# Patient Record
Sex: MALE | Employment: OTHER | URBAN - METROPOLITAN AREA
[De-identification: names, ages, dates, MRNs, and addresses within clinical notes are randomized per-mention and may not be internally consistent; named-entity substitution may affect disease eponyms.]

---

## 2017-11-06 ENCOUNTER — HOSPITAL ENCOUNTER (OUTPATIENT)
Dept: GENERAL RADIOLOGY | Age: 67
Discharge: HOME | End: 2017-11-06
Payer: MEDICARE

## 2017-11-06 DIAGNOSIS — M54.31 RIGHT SIDED SCIATICA: ICD-10-CM

## 2017-11-06 PROCEDURE — 72100 X-RAY EXAM L-S SPINE 2/3 VWS: CPT

## 2025-02-26 ENCOUNTER — APPOINTMENT (OUTPATIENT)
Facility: HOSPITAL | Age: 75
End: 2025-02-26
Payer: MEDICARE

## 2025-02-26 ENCOUNTER — HOSPITAL ENCOUNTER (INPATIENT)
Facility: HOSPITAL | Age: 75
LOS: 10 days | Discharge: HOME | End: 2025-03-08
Attending: EMERGENCY MEDICINE | Admitting: HOSPITALIST
Payer: MEDICARE

## 2025-02-26 ENCOUNTER — APPOINTMENT (OUTPATIENT)
Facility: HOSPITAL | Age: 75
End: 2025-02-26
Attending: HOSPITALIST
Payer: MEDICARE

## 2025-02-26 DIAGNOSIS — I46.9 CARDIAC ARREST (HCC): Primary | ICD-10-CM

## 2025-02-26 DIAGNOSIS — I42.9 CARDIOMYOPATHY, UNSPECIFIED TYPE (HCC): ICD-10-CM

## 2025-02-26 DIAGNOSIS — I49.9 IRREGULAR HEART BEAT: ICD-10-CM

## 2025-02-26 DIAGNOSIS — R00.1 BRADYCARDIA: ICD-10-CM

## 2025-02-26 DIAGNOSIS — Z95.2 HISTORY OF TRANSCATHETER AORTIC VALVE REPLACEMENT (TAVR): ICD-10-CM

## 2025-02-26 PROBLEM — I44.7 LEFT BUNDLE BRANCH BLOCK: Status: ACTIVE | Noted: 2025-02-26

## 2025-02-26 PROBLEM — J96.01 ACUTE RESPIRATORY FAILURE WITH HYPOXIA: Status: ACTIVE | Noted: 2025-02-26

## 2025-02-26 PROBLEM — I44.39 HIGH-GRADE ATRIOVENTRICULAR BLOCK: Status: ACTIVE | Noted: 2025-02-26

## 2025-02-26 LAB
ALBUMIN SERPL-MCNC: 2.4 G/DL (ref 3.5–5)
ALBUMIN SERPL-MCNC: 2.8 G/DL (ref 3.5–5)
ALBUMIN/GLOB SERPL: 1 (ref 1.1–2.2)
ALBUMIN/GLOB SERPL: 1 (ref 1.1–2.2)
ALP SERPL-CCNC: 102 U/L (ref 45–117)
ALP SERPL-CCNC: 106 U/L (ref 45–117)
ALT SERPL-CCNC: 232 U/L (ref 12–78)
ALT SERPL-CCNC: 381 U/L (ref 12–78)
AMMONIA PLAS-SCNC: 41 UMOL/L
ANION GAP BLD CALC-SCNC: 16 (ref 10–20)
ANION GAP SERPL CALC-SCNC: 12 MMOL/L (ref 2–12)
ANION GAP SERPL CALC-SCNC: 7 MMOL/L (ref 2–12)
APPEARANCE UR: ABNORMAL
ARTERIAL PATENCY WRIST A: POSITIVE
AST SERPL-CCNC: 374 U/L (ref 15–37)
AST SERPL-CCNC: 705 U/L (ref 15–37)
BACTERIA URNS QL MICRO: ABNORMAL /HPF
BASE DEFICIT BLD-SCNC: 10.2 MMOL/L
BASE DEFICIT BLD-SCNC: 12.4 MMOL/L
BASOPHILS # BLD: 0 K/UL (ref 0–0.1)
BASOPHILS NFR BLD: 0 % (ref 0–1)
BDY SITE: ABNORMAL
BILIRUB DIRECT SERPL-MCNC: 0.1 MG/DL (ref 0–0.2)
BILIRUB SERPL-MCNC: 1.1 MG/DL (ref 0.2–1)
BILIRUB SERPL-MCNC: 1.1 MG/DL (ref 0.2–1)
BILIRUB UR QL CFM: NEGATIVE
BUN SERPL-MCNC: 19 MG/DL (ref 6–20)
BUN SERPL-MCNC: 25 MG/DL (ref 6–20)
BUN/CREAT SERPL: 15 (ref 12–20)
BUN/CREAT SERPL: 16 (ref 12–20)
CA-I BLD-MCNC: 1.09 MMOL/L (ref 1.15–1.33)
CALCIUM SERPL-MCNC: 6.8 MG/DL (ref 8.5–10.1)
CALCIUM SERPL-MCNC: 8.4 MG/DL (ref 8.5–10.1)
CHLORIDE BLD-SCNC: 109 MMOL/L (ref 100–111)
CHLORIDE SERPL-SCNC: 109 MMOL/L (ref 97–108)
CHLORIDE SERPL-SCNC: 116 MMOL/L (ref 97–108)
CO2 BLD-SCNC: 17 MMOL/L (ref 22–29)
CO2 SERPL-SCNC: 18 MMOL/L (ref 21–32)
CO2 SERPL-SCNC: 18 MMOL/L (ref 21–32)
COLOR UR: ABNORMAL
COMMENT:: NORMAL
CREAT SERPL-MCNC: 1.23 MG/DL (ref 0.7–1.3)
CREAT SERPL-MCNC: 1.61 MG/DL (ref 0.7–1.3)
CREAT UR-MCNC: 1 MG/DL (ref 0.6–1.3)
DIFFERENTIAL METHOD BLD: ABNORMAL
ECHO AO ASC DIAM: 3.4 CM
ECHO AO ASCENDING AORTA INDEX: 1.65 CM/M2
ECHO AV AREA PEAK VELOCITY: 1.6 CM2
ECHO AV AREA VTI: 1.7 CM2
ECHO AV AREA/BSA PEAK VELOCITY: 0.8 CM2/M2
ECHO AV AREA/BSA VTI: 0.8 CM2/M2
ECHO AV MEAN GRADIENT: 6 MMHG
ECHO AV MEAN VELOCITY: 1.2 M/S
ECHO AV PEAK GRADIENT: 12 MMHG
ECHO AV PEAK VELOCITY: 1.7 M/S
ECHO AV VELOCITY RATIO: 0.53
ECHO AV VTI: 21.1 CM
ECHO BSA: 2.06 M2
ECHO BSA: 2.06 M2
ECHO LA DIAMETER INDEX: 1.41 CM/M2
ECHO LA DIAMETER: 2.9 CM
ECHO LA VOL A-L A2C: 85 ML (ref 18–58)
ECHO LA VOL A-L A4C: 82 ML (ref 18–58)
ECHO LA VOL BP: 81 ML (ref 18–58)
ECHO LA VOL MOD A2C: 84 ML (ref 18–58)
ECHO LA VOL MOD A4C: 73 ML (ref 18–58)
ECHO LA VOL/BSA BIPLANE: 39 ML/M2 (ref 16–34)
ECHO LA VOLUME AREA LENGTH: 86 ML
ECHO LA VOLUME INDEX A-L A2C: 41 ML/M2 (ref 16–34)
ECHO LA VOLUME INDEX A-L A4C: 40 ML/M2 (ref 16–34)
ECHO LA VOLUME INDEX AREA LENGTH: 42 ML/M2 (ref 16–34)
ECHO LA VOLUME INDEX MOD A2C: 41 ML/M2 (ref 16–34)
ECHO LA VOLUME INDEX MOD A4C: 35 ML/M2 (ref 16–34)
ECHO LV E' LATERAL VELOCITY: 1.87 CM/S
ECHO LV E' SEPTAL VELOCITY: 1.71 CM/S
ECHO LV EDV A2C: 93 ML
ECHO LV EDV A4C: 103 ML
ECHO LV EDV BP: 100 ML (ref 67–155)
ECHO LV EDV INDEX A4C: 50 ML/M2
ECHO LV EDV INDEX BP: 49 ML/M2
ECHO LV EDV NDEX A2C: 45 ML/M2
ECHO LV EF PHYSICIAN: 18 %
ECHO LV EJECTION FRACTION A2C: 23 %
ECHO LV EJECTION FRACTION A4C: 16 %
ECHO LV EJECTION FRACTION BIPLANE: 18 % (ref 55–100)
ECHO LV ESV A2C: 72 ML
ECHO LV ESV A4C: 86 ML
ECHO LV ESV BP: 82 ML (ref 22–58)
ECHO LV ESV INDEX A2C: 35 ML/M2
ECHO LV ESV INDEX A4C: 42 ML/M2
ECHO LV ESV INDEX BP: 40 ML/M2
ECHO LV FRACTIONAL SHORTENING: 15 % (ref 28–44)
ECHO LV INTERNAL DIMENSION DIASTOLE INDEX: 2.33 CM/M2
ECHO LV INTERNAL DIMENSION DIASTOLIC MMODE: 3.5 CM (ref 4.2–5.9)
ECHO LV INTERNAL DIMENSION DIASTOLIC: 4.8 CM (ref 4.2–5.9)
ECHO LV INTERNAL DIMENSION SYSTOLIC INDEX: 1.99 CM/M2
ECHO LV INTERNAL DIMENSION SYSTOLIC MMODE: 2.9 CM
ECHO LV INTERNAL DIMENSION SYSTOLIC: 4.1 CM
ECHO LV IVSD MMODE: 0.1 CM (ref 0.6–1)
ECHO LV IVSD: 1.9 CM (ref 0.6–1)
ECHO LV IVSS MMODE: 0.1 CM
ECHO LV MASS 2D: 437.8 G (ref 88–224)
ECHO LV MASS INDEX 2D: 212.5 G/M2 (ref 49–115)
ECHO LV POSTERIOR WALL DIASTOLIC MMODE: 0.1 CM (ref 0.6–1)
ECHO LV POSTERIOR WALL DIASTOLIC: 1.9 CM (ref 0.6–1)
ECHO LV POSTERIOR WALL SYSTOLIC MMODE: 0.1 CM
ECHO LV RELATIVE WALL THICKNESS RATIO: 0.79
ECHO LVOT AREA: 3.1 CM2
ECHO LVOT AV VTI INDEX: 0.53
ECHO LVOT DIAM: 2 CM
ECHO LVOT MEAN GRADIENT: 1 MMHG
ECHO LVOT PEAK GRADIENT: 3 MMHG
ECHO LVOT PEAK VELOCITY: 0.9 M/S
ECHO LVOT STROKE VOLUME INDEX: 17.1 ML/M2
ECHO LVOT SV: 35.2 ML
ECHO LVOT VTI: 11.2 CM
ECHO MV A VELOCITY: 0.29 M/S
ECHO MV E DECELERATION TIME (DT): 168.1 MS
ECHO MV E VELOCITY: 1.1 M/S
ECHO MV E/A RATIO: 3.79
ECHO MV E/E' LATERAL: 58.82
ECHO MV E/E' RATIO (AVERAGED): 61.58
ECHO MV E/E' SEPTAL: 64.33
ECHO MV REGURGITANT PEAK GRADIENT: 36 MMHG
ECHO MV REGURGITANT PEAK VELOCITY: 3 M/S
ECHO PULMONARY ARTERY END DIASTOLIC PRESSURE: 4 MMHG
ECHO PV MAX VELOCITY: 0.7 M/S
ECHO PV PEAK GRADIENT: 2 MMHG
ECHO PV REGURGITANT MAX VELOCITY: 1 M/S
ECHO RV FREE WALL PEAK S': 10.5 CM/S
ECHO RV INTERNAL DIMENSION: 4.4 CM
ECHO RV TAPSE: 1.1 CM (ref 1.7–?)
ECHO RVOT MEAN GRADIENT: 1 MMHG
ECHO RVOT PEAK GRADIENT: 1 MMHG
ECHO RVOT PEAK VELOCITY: 0.5 M/S
ECHO RVOT VTI: 6 CM
ECHO TV REGURGITANT MAX VELOCITY: 2.4 M/S
ECHO TV REGURGITANT PEAK GRADIENT: 25 MMHG
EOSINOPHIL # BLD: 0.17 K/UL (ref 0–0.4)
EOSINOPHIL NFR BLD: 1 % (ref 0–7)
EPITH CASTS URNS QL MICRO: ABNORMAL /LPF
ERYTHROCYTE [DISTWIDTH] IN BLOOD BY AUTOMATED COUNT: 11.9 % (ref 11.5–14.5)
ETHANOL SERPL-MCNC: <10 MG/DL (ref 0–0.08)
FLUAV RNA SPEC QL NAA+PROBE: NOT DETECTED
FLUBV RNA SPEC QL NAA+PROBE: NOT DETECTED
GAS FLOW.O2 O2 DELIVERY SYS: ABNORMAL
GAS FLOW.O2 SETTING OXYMISER: 20 BPM
GLOBULIN SER CALC-MCNC: 2.3 G/DL (ref 2–4)
GLOBULIN SER CALC-MCNC: 2.8 G/DL (ref 2–4)
GLUCOSE BLD STRIP.AUTO-MCNC: 280 MG/DL (ref 65–117)
GLUCOSE BLD STRIP.AUTO-MCNC: 297 MG/DL (ref 74–99)
GLUCOSE SERPL-MCNC: 130 MG/DL (ref 65–100)
GLUCOSE SERPL-MCNC: 235 MG/DL (ref 65–100)
GLUCOSE UR STRIP.AUTO-MCNC: 250 MG/DL
HCO3 BLD-SCNC: 17.3 MMOL/L (ref 21–28)
HCO3 BLDA-SCNC: 17 MMOL/L
HCT VFR BLD AUTO: 42 % (ref 36.6–50.3)
HGB BLD-MCNC: 13.2 G/DL (ref 12.1–17)
HGB UR QL STRIP: ABNORMAL
IMM GRANULOCYTES # BLD AUTO: 0 K/UL
IMM GRANULOCYTES NFR BLD AUTO: 0 %
INR PPP: 1.3 (ref 0.9–1.1)
KETONES UR QL STRIP.AUTO: NEGATIVE MG/DL
LACTATE BLD-SCNC: 5.88 MMOL/L (ref 0.4–2)
LACTATE SERPL-SCNC: 4.1 MMOL/L (ref 0.4–2)
LACTATE SERPL-SCNC: 4.3 MMOL/L (ref 0.4–2)
LEUKOCYTE ESTERASE UR QL STRIP.AUTO: ABNORMAL
LIPASE SERPL-CCNC: 57 U/L (ref 13–75)
LYMPHOCYTES # BLD: 2.89 K/UL (ref 0.8–3.5)
LYMPHOCYTES NFR BLD: 17 % (ref 12–49)
MAGNESIUM SERPL-MCNC: 3.4 MG/DL (ref 1.6–2.4)
MCH RBC QN AUTO: 32.4 PG (ref 26–34)
MCHC RBC AUTO-ENTMCNC: 31.4 G/DL (ref 30–36.5)
MCV RBC AUTO: 102.9 FL (ref 80–99)
METAMYELOCYTES NFR BLD MANUAL: 1 %
MONOCYTES # BLD: 0.68 K/UL (ref 0–1)
MONOCYTES NFR BLD: 4 % (ref 5–13)
MYELOCYTES NFR BLD MANUAL: 2 %
NEUTS BAND NFR BLD MANUAL: 3 % (ref 0–6)
NEUTS SEG # BLD: 12.75 K/UL (ref 1.8–8)
NEUTS SEG NFR BLD: 72 % (ref 32–75)
NITRITE UR QL STRIP.AUTO: NEGATIVE
NRBC # BLD: 0 K/UL (ref 0–0.01)
NRBC BLD-RTO: 0 PER 100 WBC
O2/TOTAL GAS SETTING VFR VENT: 100 %
PCO2 BLD: 43.1 MMHG (ref 35–48)
PCO2 BLDV: 51.1 MMHG (ref 41–51)
PEEP RESPIRATORY: 6 CMH2O
PH BLD: 7.21 (ref 7.35–7.45)
PH BLDV: 7.13 (ref 7.32–7.42)
PH UR STRIP: 5.5 (ref 5–8)
PLATELET # BLD AUTO: 191 K/UL (ref 150–400)
PMV BLD AUTO: 10.4 FL (ref 8.9–12.9)
PO2 BLD: 463 MMHG (ref 83–108)
PO2 BLDV: <27 MMHG (ref 25–40)
POTASSIUM BLD-SCNC: 3.4 MMOL/L (ref 3.5–5.5)
POTASSIUM SERPL-SCNC: 3.9 MMOL/L (ref 3.5–5.1)
POTASSIUM SERPL-SCNC: 4.8 MMOL/L (ref 3.5–5.1)
PROT SERPL-MCNC: 4.7 G/DL (ref 6.4–8.2)
PROT SERPL-MCNC: 5.6 G/DL (ref 6.4–8.2)
PROT UR STRIP-MCNC: >300 MG/DL
PROTHROMBIN TIME: 13.7 SEC (ref 9.2–11.2)
RBC # BLD AUTO: 4.08 M/UL (ref 4.1–5.7)
RBC #/AREA URNS HPF: ABNORMAL /HPF (ref 0–5)
RBC MORPH BLD: ABNORMAL
SAO2 % BLD: 100 % (ref 92–97)
SARS-COV-2 RNA RESP QL NAA+PROBE: NOT DETECTED
SERVICE CMNT-IMP: ABNORMAL
SERVICE CMNT-IMP: ABNORMAL
SODIUM BLD-SCNC: 142 MMOL/L (ref 136–145)
SODIUM SERPL-SCNC: 139 MMOL/L (ref 136–145)
SODIUM SERPL-SCNC: 141 MMOL/L (ref 136–145)
SOURCE: NORMAL
SP GR UR REFRACTOMETRY: 1.02 (ref 1–1.03)
SPECIMEN HOLD: NORMAL
SPECIMEN SITE: ABNORMAL
SPECIMEN TYPE: ABNORMAL
SPERM URNS QL MICRO: PRESENT
TROPONIN I SERPL HS-MCNC: 879 NG/L (ref 0–76)
URINE CULTURE IF INDICATED: ABNORMAL
UROBILINOGEN UR QL STRIP.AUTO: 1 EU/DL (ref 0.2–1)
VENTILATION MODE VENT: ABNORMAL
VT SETTING VENT: 400 ML
WBC # BLD AUTO: 17 K/UL (ref 4.1–11.1)
WBC URNS QL MICRO: ABNORMAL /HPF (ref 0–4)

## 2025-02-26 PROCEDURE — 84484 ASSAY OF TROPONIN QUANT: CPT

## 2025-02-26 PROCEDURE — 99291 CRITICAL CARE FIRST HOUR: CPT | Performed by: INTERNAL MEDICINE

## 2025-02-26 PROCEDURE — 31500 INSERT EMERGENCY AIRWAY: CPT

## 2025-02-26 PROCEDURE — 02HK3JZ INSERTION OF PACEMAKER LEAD INTO RIGHT VENTRICLE, PERCUTANEOUS APPROACH: ICD-10-PCS | Performed by: HOSPITALIST

## 2025-02-26 PROCEDURE — 5A1223Z PERFORMANCE OF CARDIAC PACING, CONTINUOUS: ICD-10-PCS | Performed by: HOSPITALIST

## 2025-02-26 PROCEDURE — 93005 ELECTROCARDIOGRAM TRACING: CPT | Performed by: EMERGENCY MEDICINE

## 2025-02-26 PROCEDURE — C1894 INTRO/SHEATH, NON-LASER: HCPCS | Performed by: HOSPITALIST

## 2025-02-26 PROCEDURE — 2500000003 HC RX 250 WO HCPCS: Performed by: HOSPITALIST

## 2025-02-26 PROCEDURE — 2000000000 HC ICU R&B

## 2025-02-26 PROCEDURE — 82330 ASSAY OF CALCIUM: CPT

## 2025-02-26 PROCEDURE — 3E033XZ INTRODUCTION OF VASOPRESSOR INTO PERIPHERAL VEIN, PERCUTANEOUS APPROACH: ICD-10-PCS | Performed by: EMERGENCY MEDICINE

## 2025-02-26 PROCEDURE — 87636 SARSCOV2 & INF A&B AMP PRB: CPT

## 2025-02-26 PROCEDURE — 76937 US GUIDE VASCULAR ACCESS: CPT | Performed by: HOSPITALIST

## 2025-02-26 PROCEDURE — 71045 X-RAY EXAM CHEST 1 VIEW: CPT

## 2025-02-26 PROCEDURE — 93005 ELECTROCARDIOGRAM TRACING: CPT | Performed by: HOSPITALIST

## 2025-02-26 PROCEDURE — 93306 TTE W/DOPPLER COMPLETE: CPT | Performed by: STUDENT IN AN ORGANIZED HEALTH CARE EDUCATION/TRAINING PROGRAM

## 2025-02-26 PROCEDURE — 36415 COLL VENOUS BLD VENIPUNCTURE: CPT

## 2025-02-26 PROCEDURE — 82962 GLUCOSE BLOOD TEST: CPT

## 2025-02-26 PROCEDURE — 84295 ASSAY OF SERUM SODIUM: CPT

## 2025-02-26 PROCEDURE — 2709999900 HC NON-CHARGEABLE SUPPLY: Performed by: HOSPITALIST

## 2025-02-26 PROCEDURE — 87086 URINE CULTURE/COLONY COUNT: CPT

## 2025-02-26 PROCEDURE — 83735 ASSAY OF MAGNESIUM: CPT

## 2025-02-26 PROCEDURE — 6360000002 HC RX W HCPCS: Performed by: HOSPITALIST

## 2025-02-26 PROCEDURE — 82140 ASSAY OF AMMONIA: CPT

## 2025-02-26 PROCEDURE — 2500000003 HC RX 250 WO HCPCS: Performed by: EMERGENCY MEDICINE

## 2025-02-26 PROCEDURE — 94761 N-INVAS EAR/PLS OXIMETRY MLT: CPT

## 2025-02-26 PROCEDURE — 81001 URINALYSIS AUTO W/SCOPE: CPT

## 2025-02-26 PROCEDURE — 6360000002 HC RX W HCPCS: Performed by: EMERGENCY MEDICINE

## 2025-02-26 PROCEDURE — 82077 ASSAY SPEC XCP UR&BREATH IA: CPT

## 2025-02-26 PROCEDURE — 70450 CT HEAD/BRAIN W/O DYE: CPT

## 2025-02-26 PROCEDURE — 99223 1ST HOSP IP/OBS HIGH 75: CPT | Performed by: INTERNAL MEDICINE

## 2025-02-26 PROCEDURE — 2580000003 HC RX 258: Performed by: EMERGENCY MEDICINE

## 2025-02-26 PROCEDURE — C1892 INTRO/SHEATH,FIXED,PEEL-AWAY: HCPCS | Performed by: HOSPITALIST

## 2025-02-26 PROCEDURE — 6360000002 HC RX W HCPCS: Performed by: PHYSICIAN ASSISTANT

## 2025-02-26 PROCEDURE — 80076 HEPATIC FUNCTION PANEL: CPT

## 2025-02-26 PROCEDURE — 51702 INSERT TEMP BLADDER CATH: CPT

## 2025-02-26 PROCEDURE — 99291 CRITICAL CARE FIRST HOUR: CPT

## 2025-02-26 PROCEDURE — 85610 PROTHROMBIN TIME: CPT

## 2025-02-26 PROCEDURE — 99292 CRITICAL CARE ADDL 30 MIN: CPT

## 2025-02-26 PROCEDURE — 93306 TTE W/DOPPLER COMPLETE: CPT

## 2025-02-26 PROCEDURE — 80053 COMPREHEN METABOLIC PANEL: CPT

## 2025-02-26 PROCEDURE — 82803 BLOOD GASES ANY COMBINATION: CPT

## 2025-02-26 PROCEDURE — 36600 WITHDRAWAL OF ARTERIAL BLOOD: CPT

## 2025-02-26 PROCEDURE — 6370000000 HC RX 637 (ALT 250 FOR IP): Performed by: EMERGENCY MEDICINE

## 2025-02-26 PROCEDURE — C1898 LEAD, PMKR, OTHER THAN TRANS: HCPCS | Performed by: HOSPITALIST

## 2025-02-26 PROCEDURE — 84132 ASSAY OF SERUM POTASSIUM: CPT

## 2025-02-26 PROCEDURE — 5A1945Z RESPIRATORY VENTILATION, 24-96 CONSECUTIVE HOURS: ICD-10-PCS | Performed by: EMERGENCY MEDICINE

## 2025-02-26 PROCEDURE — 5A12012 PERFORMANCE OF CARDIAC OUTPUT, SINGLE, MANUAL: ICD-10-PCS | Performed by: EMERGENCY MEDICINE

## 2025-02-26 PROCEDURE — 83690 ASSAY OF LIPASE: CPT

## 2025-02-26 PROCEDURE — 33210 INSERT ELECTRD/PM CATH SNGL: CPT | Performed by: HOSPITALIST

## 2025-02-26 PROCEDURE — 94002 VENT MGMT INPAT INIT DAY: CPT

## 2025-02-26 PROCEDURE — 85025 COMPLETE CBC W/AUTO DIFF WBC: CPT

## 2025-02-26 PROCEDURE — 83605 ASSAY OF LACTIC ACID: CPT

## 2025-02-26 PROCEDURE — 2580000003 HC RX 258: Performed by: HOSPITALIST

## 2025-02-26 PROCEDURE — 82947 ASSAY GLUCOSE BLOOD QUANT: CPT

## 2025-02-26 PROCEDURE — 92950 HEART/LUNG RESUSCITATION CPR: CPT

## 2025-02-26 DEVICE — LEAD 5076-58 MRI US RCMCRD
Type: IMPLANTABLE DEVICE | Status: FUNCTIONAL
Brand: CAPSUREFIX NOVUS MRI™ SURESCAN®

## 2025-02-26 RX ORDER — POLYETHYLENE GLYCOL 3350 17 G/17G
17 POWDER, FOR SOLUTION ORAL DAILY PRN
Status: DISCONTINUED | OUTPATIENT
Start: 2025-02-26 | End: 2025-03-08 | Stop reason: HOSPADM

## 2025-02-26 RX ORDER — FENTANYL CITRATE-0.9 % NACL/PF 10 MCG/ML
25-200 PLASTIC BAG, INJECTION (ML) INTRAVENOUS CONTINUOUS
Status: DISCONTINUED | OUTPATIENT
Start: 2025-02-26 | End: 2025-02-27

## 2025-02-26 RX ORDER — SODIUM CHLORIDE 9 MG/ML
INJECTION, SOLUTION INTRAVENOUS PRN
Status: DISCONTINUED | OUTPATIENT
Start: 2025-02-26 | End: 2025-03-08 | Stop reason: HOSPADM

## 2025-02-26 RX ORDER — 0.9 % SODIUM CHLORIDE 0.9 %
1000 INTRAVENOUS SOLUTION INTRAVENOUS ONCE
Status: COMPLETED | OUTPATIENT
Start: 2025-02-26 | End: 2025-02-26

## 2025-02-26 RX ORDER — SODIUM CHLORIDE 0.9 % (FLUSH) 0.9 %
5-40 SYRINGE (ML) INJECTION EVERY 12 HOURS SCHEDULED
Status: DISCONTINUED | OUTPATIENT
Start: 2025-02-26 | End: 2025-03-08 | Stop reason: HOSPADM

## 2025-02-26 RX ORDER — CLOPIDOGREL BISULFATE 75 MG/1
75 TABLET ORAL DAILY
COMMUNITY

## 2025-02-26 RX ORDER — ACETAMINOPHEN 325 MG/1
650 TABLET ORAL EVERY 6 HOURS PRN
Status: DISCONTINUED | OUTPATIENT
Start: 2025-02-26 | End: 2025-03-03

## 2025-02-26 RX ORDER — DOBUTAMINE HYDROCHLORIDE 200 MG/100ML
2.5-1 INJECTION INTRAVENOUS CONTINUOUS
Status: DISCONTINUED | OUTPATIENT
Start: 2025-02-26 | End: 2025-02-28

## 2025-02-26 RX ORDER — PROPOFOL 10 MG/ML
5-50 INJECTION, EMULSION INTRAVENOUS CONTINUOUS
Status: DISCONTINUED | OUTPATIENT
Start: 2025-02-26 | End: 2025-02-27

## 2025-02-26 RX ORDER — ONDANSETRON 4 MG/1
4 TABLET, ORALLY DISINTEGRATING ORAL EVERY 8 HOURS PRN
Status: DISCONTINUED | OUTPATIENT
Start: 2025-02-26 | End: 2025-03-08 | Stop reason: HOSPADM

## 2025-02-26 RX ORDER — ONDANSETRON 2 MG/ML
4 INJECTION INTRAMUSCULAR; INTRAVENOUS EVERY 6 HOURS PRN
Status: DISCONTINUED | OUTPATIENT
Start: 2025-02-26 | End: 2025-03-08 | Stop reason: HOSPADM

## 2025-02-26 RX ORDER — LIDOCAINE HYDROCHLORIDE 20 MG/ML
JELLY TOPICAL DAILY PRN
Status: COMPLETED | OUTPATIENT
Start: 2025-02-26 | End: 2025-02-26

## 2025-02-26 RX ORDER — EPINEPHRINE IN SOD CHLOR,ISO 1 MG/10 ML
SYRINGE (ML) INTRAVENOUS DAILY PRN
Status: COMPLETED | OUTPATIENT
Start: 2025-02-26 | End: 2025-02-26

## 2025-02-26 RX ORDER — ENOXAPARIN SODIUM 100 MG/ML
40 INJECTION SUBCUTANEOUS EVERY 24 HOURS
Status: DISCONTINUED | OUTPATIENT
Start: 2025-02-26 | End: 2025-02-27

## 2025-02-26 RX ORDER — SODIUM CHLORIDE 0.9 % (FLUSH) 0.9 %
5-40 SYRINGE (ML) INJECTION PRN
Status: DISCONTINUED | OUTPATIENT
Start: 2025-02-26 | End: 2025-03-08 | Stop reason: HOSPADM

## 2025-02-26 RX ORDER — MAGNESIUM SULFATE IN WATER 40 MG/ML
2000 INJECTION, SOLUTION INTRAVENOUS PRN
Status: DISCONTINUED | OUTPATIENT
Start: 2025-02-26 | End: 2025-03-08 | Stop reason: HOSPADM

## 2025-02-26 RX ORDER — ASPIRIN 81 MG/1
81 TABLET, CHEWABLE ORAL DAILY
Status: ON HOLD | COMMUNITY
End: 2025-03-08 | Stop reason: HOSPADM

## 2025-02-26 RX ORDER — ACETAMINOPHEN 650 MG/1
650 SUPPOSITORY RECTAL EVERY 6 HOURS PRN
Status: DISCONTINUED | OUTPATIENT
Start: 2025-02-26 | End: 2025-03-08 | Stop reason: HOSPADM

## 2025-02-26 RX ORDER — 0.9 % SODIUM CHLORIDE 0.9 %
1000 INTRAVENOUS SOLUTION INTRAVENOUS ONCE
Status: DISCONTINUED | OUTPATIENT
Start: 2025-02-26 | End: 2025-02-26

## 2025-02-26 RX ORDER — SODIUM CHLORIDE 9 MG/ML
INJECTION, SOLUTION INTRAVENOUS CONTINUOUS
Status: DISCONTINUED | OUTPATIENT
Start: 2025-02-26 | End: 2025-02-27

## 2025-02-26 RX ORDER — FENTANYL CITRATE-0.9 % NACL/PF 20 MCG/2ML
25 SYRINGE (ML) INTRAVENOUS EVERY 30 MIN PRN
Status: DISCONTINUED | OUTPATIENT
Start: 2025-02-26 | End: 2025-02-27

## 2025-02-26 RX ORDER — POTASSIUM CHLORIDE 29.8 MG/ML
20 INJECTION INTRAVENOUS PRN
Status: DISCONTINUED | OUTPATIENT
Start: 2025-02-26 | End: 2025-03-08 | Stop reason: HOSPADM

## 2025-02-26 RX ORDER — POTASSIUM CHLORIDE 7.45 MG/ML
10 INJECTION INTRAVENOUS PRN
Status: DISCONTINUED | OUTPATIENT
Start: 2025-02-26 | End: 2025-03-08 | Stop reason: HOSPADM

## 2025-02-26 RX ORDER — NOREPINEPHRINE BITARTRATE 0.06 MG/ML
1-100 INJECTION, SOLUTION INTRAVENOUS CONTINUOUS
Status: DISCONTINUED | OUTPATIENT
Start: 2025-02-26 | End: 2025-02-27

## 2025-02-26 RX ADMIN — AMIODARONE HYDROCHLORIDE 0.5 MG/MIN: 50 INJECTION, SOLUTION INTRAVENOUS at 20:00

## 2025-02-26 RX ADMIN — EPINEPHRINE 2 MCG/MIN: 1 INJECTION INTRAMUSCULAR; INTRAVENOUS; SUBCUTANEOUS at 08:50

## 2025-02-26 RX ADMIN — Medication 25 MCG/HR: at 21:09

## 2025-02-26 RX ADMIN — EPINEPHRINE 1 MG: 0.1 INJECTION, SOLUTION ENDOTRACHEAL; INTRACARDIAC; INTRAVENOUS at 08:36

## 2025-02-26 RX ADMIN — SODIUM CHLORIDE 1000 ML: 9 INJECTION, SOLUTION INTRAVENOUS at 10:54

## 2025-02-26 RX ADMIN — AMIODARONE HYDROCHLORIDE 0.5 MG/MIN: 50 INJECTION, SOLUTION INTRAVENOUS at 11:53

## 2025-02-26 RX ADMIN — PROPOFOL 25 MCG/KG/MIN: 10 INJECTION, EMULSION INTRAVENOUS at 22:43

## 2025-02-26 RX ADMIN — SODIUM CHLORIDE, PRESERVATIVE FREE 10 ML: 5 INJECTION INTRAVENOUS at 14:27

## 2025-02-26 RX ADMIN — AMIODARONE HYDROCHLORIDE 1 MG/MIN: 50 INJECTION, SOLUTION INTRAVENOUS at 09:23

## 2025-02-26 RX ADMIN — NOREPINEPHRINE BITARTRATE 5 MCG/MIN: 64 SOLUTION INTRAVENOUS at 09:58

## 2025-02-26 RX ADMIN — SODIUM CHLORIDE 1000 ML: 9 INJECTION, SOLUTION INTRAVENOUS at 14:25

## 2025-02-26 RX ADMIN — LIDOCAINE HYDROCHLORIDE 100 MG: 20 JELLY TOPICAL at 08:31

## 2025-02-26 RX ADMIN — PROPOFOL 20 MCG/KG/MIN: 10 INJECTION, EMULSION INTRAVENOUS at 08:59

## 2025-02-26 RX ADMIN — SODIUM CHLORIDE, PRESERVATIVE FREE 10 ML: 5 INJECTION INTRAVENOUS at 21:10

## 2025-02-26 RX ADMIN — PROPOFOL 25 MCG/KG/MIN: 10 INJECTION, EMULSION INTRAVENOUS at 15:20

## 2025-02-26 ASSESSMENT — PULMONARY FUNCTION TESTS
PIF_VALUE: 17
PIF_VALUE: 17
PIF_VALUE: 16
PIF_VALUE: 16

## 2025-02-26 NOTE — PROCEDURES
ELECTROPHYSIOLOGY PROCEDURE     PROCEDURE DATE: 2/26/2025    OPERATION PERFORMED:    -Right internal jugular venous access (ultrasound-guided)   -Single-chamber Temporary-Permament Ventricular Pacing Lead Implantation (screw-in Medtronic [id number] lead)     LAB PHYSICIAN: Stuart Sena MD     COMPLICATIONS: None.      TIME OUT: Time out was completed with verification of the correct patient identity, procedure to be performed, procedure site and implanted equipment.      INDICATION:  High-grade AV block  Reported torsade the points  Cardiac arrest with prolonged down time     PROCEDURE AND FINDINGS:  Informed consent was given prior to the procedure and confirmed.   Patient was intubated and sedated.  Informed consent was obtained from patient's MPOA.  Risk and benefits explained in detail and MPOA agrees to proceed with procedure.     Intravenous prophylactic antibiotics were administered prior to the procedure. After the site of implantation was prepped and draped in the usual sterile fashion and after adequate anesthesia was given, the skin was infiltrated with local anesthetic.       ULTRA-SOUND-GUIDED ACCESS   Ultrasound was utilized to confirm right IJ venous patency. Needle access was obtained under ultrasound guidance and a permanent recording obtained.  The right IJ vein was accessed via modified Seldinger technique under ultrasound guidance with a micropuncture needle. A single access was obtained.  J tip 0.035 inch guide wire/wires were introduced and their course throughout the venous system was confirmed by their presence under fluoroscopy in the inferior vena cava.        VENTRICULAR LEAD   A peel away sheath was brought to the field and placed into the venous system via over the wire technique.  The right ventricular lead (Medtronic 5076 lead, 58 cm) was placed via this sheath into the RV.  On the initial to position in anteroseptum, threshold was high.  Therefore reposition it to inferoseptum

## 2025-02-26 NOTE — H&P
SOUND CRITICAL CARE HPI.      Name: Jeb Pereira   : 1950   MRN: [id number]   Date: 2025      Chief Complaint   Patient presents with    Cardiac Arrest     Cardiac arrest, significant downtime with CPR of 80-90 mins total    HPI     History is limited, obtained from wife/ daughter at bedside and EMR review.   Patient had underwent TAVR at [facility] last week with prior normal LHC and was discharged home on DAPT that he has been compliant to. Wife reports no other medication and patient has been doing fine until this AM when she heard her  falling and found him on the ground. She called 911. Shortly before EMS arrived, patient became unresponsive, pulseless and cyanosed. EMS started CPR and continued for almost an hour in field with shockable rhythm and suspicion of torsades. Patient was shocked multiple times and brought in ER where CPR continued and total CPR time around 80-90 mins.   EKG in ER is showing bradycardia with wide complex, junctional rhythm. Patient was started on Epi and Levophed added.   CT head with no bleeding. ICU service was called for ICU admission.   On my initial evaluation in ER, patient was on 20 mcg of levophed and 4 mcg of Epi with amiodarone eat rate of 1 mg per minute.   Patient on no sedation, not responsive but breathing over the vent.   Stat echo and cardiology consult placed. Echo reported as reduced LV function and EF 18%, grade 3 DD, concentric LVH and increased LV mass, highly suspicious for infiltrative CMP.    On arrival in ICU, failed to wean epi and patient became hypotensive with intermittent episodes of bradycardia and Epi drip was started back.   Patient was evaluated by cardiology and in evening taken to cath lab for pacemaker insertion.     Assessment & Plan     # Sudden Cardiac arrest, at home. Significant down time of 80-90 mins when patient achieved ROSC and lost the pulse again. Multiple shocks given by EMS for shock-able rhythm  Provider   clopidogrel (PLAVIX) 75 MG tablet Take 1 tablet by mouth daily   Yes ProviderCesilia MD   aspirin 81 MG chewable tablet Take 1 tablet by mouth daily   Yes Provider, Cesilia, MD         Allergies/Social/Family History:     Not on File   Social History     Tobacco Use    Smoking status: Not on file    Smokeless tobacco: Not on file   Substance Use Topics    Alcohol use: Not on file      No family history on file.    LABS AND  DATA:   Reviewed    Peak airway pressure:      Minute ventilation:

## 2025-02-26 NOTE — CARE COORDINATION
Care Management Initial Assessment  2/26/2025 12:08 PM  If patient is discharged prior to next notation, then this note serves as note for discharge by case management.    Reason for Admission:   Cardiac arrest (HCC) [I46.9]         Patient Admission Status: Inpatient  Date Admitted to [facility]: 2/26  RUR: Readmission Risk Score: 8.7    Hospitalization in the last 30 days (Readmission):  No        Advance Care Planning:  Code Status: Full Code  Primary Healthcare Decision Maker: (P) Legal Next of Kin   Advance Directive: has an advanced directive - a copy HAS NOT been provided.     __________________________________________________________________________  Assessment:      02/26/25 1206   Service Assessment   Patient Orientation Unresponsive;Unable to Assess   Cognition Other (see comment)  (pt came in unresponsive on PATRICK device)   History Provided By Medical Record;Child/Family   Primary Caregiver Self   Accompanied By/Relationship Spouse, child   Support Systems Spouse/Significant Other;Children;Family Members   Patient's Healthcare Decision Maker is: Legal Next of Kin   Prior Functional Level Independent in ADLs/IADLs   Current Functional Level Independent in ADLs/IADLs   Can patient return to prior living arrangement Unknown at present   Ability to make needs known: Unable   Family able to assist with home care needs: Other (comment)  (dependent on pt's stabilization and abilities at dc)   Would you like for me to discuss the discharge plan with any other family members/significant others, and if so, who? No   Financial Resources Medicare;Other (Comment)  ([employer] secondary)   Community Resources None   Social/Functional History   Lives With Spouse   Prior Level of Assist for ADLs Independent   Prior Level of Assist for Homemaking Independent   Ambulation Assistance Independent   Prior Level of Assist for Transfers Independent         Comments:   Pt was a code blue, came in with EMS on PATRICK device unresponsive  recommendations.        Camelia Taylor  Case Management Department  For questions or concerns, please PerfectServe

## 2025-02-26 NOTE — CARE COORDINATION
Care Management Initial Assessment  2/26/2025 3:54 PM  If patient is discharged prior to next notation, then this note serves as note for discharge by case management.    Reason for Admission:   Cardiac arrest (HCC) [I46.9]  Procedure(s) (LRB):  Insert temporary pacemaker (N/A)       Patient Admission Status: Inpatient  Date Admitted to INP: 2/26/25  RUR: Readmission Risk Score: 8.7    Hospitalization in the last 30 days (Readmission):          Advance Care Planning:  Code Status: Full Code  Primary Healthcare Decision Maker: Legal Next of Kin   Advance Directive: unknown - pt can't communicate and pt is intubated      __________________________________________________________________________  Assessment:   Recent TAVR @ [facility]  Wife found pt unresponsive and without a pulse    See ED case manager's initial assessment    Comments: intubated and sedated     Discharge Concerns: []Yes []No []Unknown-unknown    Describe:    Financial concerns/barriers: []Yes, explain: [x]No []Unknown/Not discussed  __________________________________________________________________________    Insurer:   Active Insurance as of 2/26/2025       Primary Coverage       Payor Plan Insurance Group Employer/Plan Group    MEDICARE MEDICARE PART A AND B        Payor Address Payor Phone Number Payor Fax Number Effective Dates    [address] [address] [phone]  7/1/2016 - None Entered    [facility] [address]         Subscriber Name Subscriber Birth Date Member ID       MAXWELL WEEKS 1950 [id number]               Secondary Coverage       Payor Plan Insurance Group Employer/Plan Group    CIGNA CIGNA MEDICARE SUPP        Payor Plan Address Payor Plan Phone Number Payor Plan Fax Number Effective Dates    [address] [address]   9/1/2021 - None [facility] [address]         Subscriber Name Subscriber Birth Date Member ID       MAXWELL WEEKS 1950 [id number]                     PCP: Farrah Solis PA   Address: [address] [place] [address]  / [facility] [address]   Phone number: [phone]    Pharmacy:   [id number] - [place], [place] - [address] - P [phone] - F [phone]  [address]  [facility] [address]  Phone: [phone] Fax: [phone]    DC Transport:  to be determined        Transition of care plan:    [x]Unable to determine at this time. Awaiting clinical progress, and disposition recommendations.    [] Home. No assistance required.     [] Home. Pt refused recommended services.    [] Home with family assistance as needed, and outpatient follow-up.    [] Home with Outpatient PT and outpatient follow-up   Pt aware of OP appt? []Yes, Provider:   []Not scheduled   Transport provider:     [] Home with outpatient services.    Specify:    [] Home with Home Health   - [place] of Choice offered? [] Yes, Preference:   [] NA    []SNF/IPR   -[]Freedom of Choice offered, and preferences given:   []Listing provided and preferences requested   -Status: []Pending []Accepted:    -Auth required: []Yes []No    -Auth initiated date:   -3 midnight stay required: []Yes []No  Date satisfied:     [] LTC:     [] Home with Hospice   - [place] of Choice offered? [] Yes, Preference:   [] NA    [] [facility] information provided.     [x] Other: registration notified about need for first IMM letter      JOHN PABLO RN  Case Management Department  For questions or concerns, please PerfectServe

## 2025-02-26 NOTE — ED PROVIDER NOTES
[facility] EMERGENCY DEPARTMENT  EMERGENCY DEPARTMENT ENCOUNTER      Pt Name: Jeb Pereira  MRN: [id number]  Birthdate 1950  Date of evaluation: 2/26/2025  Provider: Desmond Mtz MD    CHIEF COMPLAINT       Chief Complaint   Patient presents with    Cardiac Arrest         HISTORY OF PRESENT ILLNESS   (Location/Symptom, Timing/Onset, Context/Setting, Quality, Duration, Modifying Factors, Severity)  Note limiting factors.   Jeb Pereira is a 74 y.o. male who presents to the emergency department      The history is provided by the spouse and the EMS personnel. No  was used.       Nursing Notes were reviewed.    REVIEW OF SYSTEMS    (2-9 systems for level 4, 10 or more for level 5)     Review of Systems   Unable to perform ROS: Patient unresponsive       Except as noted above the remainder of the review of systems was reviewed and negative.       PAST MEDICAL HISTORY   No past medical history on file.      SURGICAL HISTORY     No past surgical history on file.      CURRENT MEDICATIONS       Previous Medications    No medications on file       ALLERGIES     Patient has no allergy information on record.    FAMILY HISTORY     No family history on file.       SOCIAL HISTORY       Social History     Socioeconomic History    Marital status:        SCREENINGS                               CIWA Assessment  Pulse: 70                 PHYSICAL EXAM    (up to 7 for level 4, 8 or more for level 5)     ED Triage Vitals   BP Systolic BP Percentile Diastolic BP Percentile Temp Temp src Pulse Resp SpO2   -- -- -- -- -- 02/26/25 0836 -- --        (!) 44        Height Weight - Scale         -- 02/26/25 0845          85 kg (187 lb 8 oz)             Physical Exam  Constitutional:       General: He is in acute distress.      Appearance: He is toxic-appearing.      Interventions: He is intubated.   HENT:      Head: Raccoon eyes and abrasion present.   Cardiovascular:      Comments:  Consult for Admission  9:13 AM    ED Room Number: ER16/16  Patient Name and age:  Jeb Pereira 74 y.o.  male  Working Diagnosis:   1. Cardiac arrest (HCC)        COVID-19 Suspicion: No  Sepsis present:  No  Reassessment needed: Yes  Code Status:  Full Code  Readmission: No  Isolation Requirements: no  Recommended Level of Care: ICU  Department: [place] ED - [phone]  Consulting Provider: d/w Dr. Harrington    CRITICAL CARE TIME   Total Critical Care time was 120 minutes, excluding separately reportable procedures.  There was a high probability of clinically significant/life threatening deterioration in the patient's condition which required my urgent intervention.      CONSULTS:  None    PROCEDURES:  Unless otherwise noted below, none     Procedures        FINAL IMPRESSION    No diagnosis found.      DISPOSITION/PLAN   DISPOSITION        PATIENT REFERRED TO:  No follow-up provider specified.    DISCHARGE MEDICATIONS:  New Prescriptions    No medications on file     Controlled Substances Monitoring:          No data to display                (Please note that portions of this note were completed with a voice recognition program.  Efforts were made to edit the dictations but occasionally words are mis-transcribed.)    Desmond Mtz MD (electronically signed)  Attending Emergency Physician           Desmond Mtz MD  02/26/25 5392

## 2025-02-26 NOTE — CONSULTS
arrest.  Will obtain medical records from [facility].      3. Acute hypoxic resp failure:  Per ICU team.            ____________________________________________________________      Cardiac testing  No results found for this or any previous visit.    No results found for this or any previous visit.     No results found for this or any previous visit.        Most recent HS troponins:  Recent Labs     02/26/25  0849   TROPHS 879*       ECG:  SB-SR with wide QRS, AV dissociation on telemetry     Review of Systems:    []All other systems reviewed and all negative except as written in HPI    [x] Patient unable to provide secondary to condition    No past medical history on file.  No past surgical history on file.  Social Hx:    Family Hx: family history is not on file.  Not on File       OBJECTIVE:  Wt Readings from Last 3 Encounters:   02/26/25 83.9 kg (185 lb)     Net IO Since Admission: No IO data has been entered for this period [02/26/25 1229]     Physical Exam:    Vitals:   Vitals:    02/26/25 1145 02/26/25 1200 02/26/25 1211 02/26/25 1215   BP: (!) 83/68 90/69 (!) 83/68 94/70   Pulse: 70 69  73   Resp:       Temp:  96.8 °F (36 °C)  97.2 °F (36.2 °C)   TempSrc:       SpO2: 100% 100%  100%   Weight:   83.9 kg (185 lb)    Height:   1.829 m (6')      Telemetry:    SB-SR with wide QRS, AV dissociation on telemetry     Gen: Intubated/sedated -- extensive bruising to left/back of head  Neck: Supple, No JVD, No Carotid Bruit  Resp: No accessory muscle use, Clear breath sounds, No rales or rhonchi  Card: Regular Rate,Rhythm, Normal S1, S2, No murmurs, rubs or gallop. No thrills.   Abd:   Soft, non-tender, non-distended, BS+   MSK: No cyanosis  Skin: No rashes    Neuro: intubated/sedated   Psych:  BRONSON  LE: No edema          Data Review:     Radiology:   XR Results (most recent):  CT Result (most recent):  CT HEAD WO CONTRAST 02/26/2025    Narrative  INDICATION:   rosc    EXAM:  HEAD CT WITHOUT CONTRAST    COMPARISON:   1-20 mcg/min, IntraVENous, Continuous, Desmond Mtz MD, Last Rate: 6 mL/hr at 02/26/25 0850, 2 mcg/min at 02/26/25 0850    norepinephrine (LEVOPHED) 16 mg in sodium chloride 0.9 % 250 mL infusion (premix), 1-100 mcg/min, IntraVENous, Continuous, Desmond Mtz MD, Last Rate: 13.1 mL/hr at 02/26/25 1141, 14 mcg/min at 02/26/25 1141    amiodarone (CORDARONE) 450 mg in dextrose 5 % 250 mL infusion ([id number]), 1 mg/min, IntraVENous, Continuous, Stopped at 02/26/25 1152 **FOLLOWED BY** amiodarone (CORDARONE) 450 mg in dextrose 5 % 250 mL infusion ([id number]), 0.5 mg/min, IntraVENous, Continuous, Desmond Mtz MD, Last Rate: 16.7 mL/hr at 02/26/25 1153, 0.5 mg/min at 02/26/25 1153    0.9 % sodium chloride infusion, , IntraVENous, Continuous, Desmond Mtz MD    sodium chloride flush 0.9 % injection 5-40 mL, 5-40 mL, IntraVENous, 2 times per day, Alyssa Harrington MD    sodium chloride flush 0.9 % injection 5-40 mL, 5-40 mL, IntraVENous, PRN, Alyssa Harrington MD    0.9 % sodium chloride infusion, , IntraVENous, PRN, Alyssa Harrington MD    potassium chloride 20 mEq/50 mL IVPB (Central Line), 20 mEq, IntraVENous, PRN **OR** potassium chloride 10 mEq/100 mL IVPB (Peripheral Line), 10 mEq, IntraVENous, PRN, Alyssa Harrington MD    magnesium sulfate 2000 mg in 50 mL IVPB premix, 2,000 mg, IntraVENous, PRN, Alyssa Harrington MD    enoxaparin (LOVENOX) injection 40 mg, 40 mg, SubCUTAneous, Q24H, Alyssa Harrington MD    ondansetron (ZOFRAN-ODT) disintegrating tablet 4 mg, 4 mg, Oral, Q8H PRN **OR** ondansetron (ZOFRAN) injection 4 mg, 4 mg, IntraVENous, Q6H PRN, Alyssa Harrington MD    polyethylene glycol (GLYCOLAX) packet 17 g, 17 g, Oral, Daily PRN, Alyssa Harrington MD    acetaminophen (TYLENOL) tablet 650 mg, 650 mg, Oral, Q6H PRN **OR** acetaminophen (TYLENOL) suppository 650 mg, 650 mg, Rectal, Q6H PRN, Alyssa Harrington MD    Current Outpatient Medications:     clopidogrel (PLAVIX) 75 MG tablet, Take 1

## 2025-02-26 NOTE — ED NOTES
TRANSFER - OUT REPORT:    Verbal report given to Marely Pereira  being transferred to ICU for routine progression of patient care       Report consisted of patient's Situation, Background, Assessment and   Recommendations(SBAR).     Information from the following report(s) Nurse Handoff Report, Index, ED Encounter Summary, ED SBAR, and Adult Overview was reviewed with the receiving nurse.    [place] Fall Assessment:    Presents to emergency department  because of falls (Syncope, seizure, or loss of consciousness): Yes  Age > 70: Yes  Altered Mental Status, Intoxication with alcohol or substance confusion (Disorientation, impaired judgment, poor safety awaremess, or inability to follow instructions): Yes  Impaired Mobility: Ambulates or transfers with assistive devices or assistance; Unable to ambulate or transer.: Yes  Nursing Judgement: Yes          Lines:   Peripheral IV 02/26/25 Right Antecubital (Active)   Site Assessment Clean, dry & intact 02/26/25 0832       Peripheral IV 02/26/25 Left Antecubital (Active)       Peripheral IV 02/26/25 Right;Anterior Wrist (Active)       Peripheral IV 02/26/25 Right;Anterior Cephalic (Active)       Intraosseous Line 02/26/25 Right;Anterior Tibia (Active)        Opportunity for questions and clarification was provided.      Patient transported with:  Registered Nurse

## 2025-02-26 NOTE — CODE DOCUMENTATION
Patient found in bathroom unrespoinsive.    Wife started CPR  EMS arrived continuing ACLS.  4 epi given along with 2 mag.    ACLS cont upon arrival.

## 2025-02-26 NOTE — PROGRESS NOTES
Patient transported from ER to ICU on ventilator.  Ambu bag and mask in place for emergency use if needed.  RN X 2 and RT transported patient without incident.  Report given to ICU RT.

## 2025-02-26 NOTE — PROGRESS NOTES
1603 Cath lab nurses at bedside to transport pt. MD called for emergent temp pacer r/t pt intermittently HR asystole  1815 Pt back from cath lab. Has new right IJ temporary-permanent pacemaker implantation. Vital signs stable.

## 2025-02-26 NOTE — CONSULTS
[facility] CARDIOLOGY  Cardiac Electrophysiology Note    Initial Encounter/Consult Note    Patient Name: Jeb Pereira - :1950 - MRN:[id number]  Primary Cardiologist: Dr. Jason BLACKBURN   Consulting Cardiologist: Stuart Sena MD       Reason for encounter:   Cardiac Arrest    HPI:  74 y.o. male with severe aortic stenosis status post TAVR 25 at [facility].     Per wife, when she was getting up in the morning she heard a loud bang and found the patient on the floor.  He was still speaking and breathing at the time but shortly afterwards became unresponsive.  Per wife he was still breathing but progressively became worse and became blue.  EMS arrived and then performed CPR.  I do not have EMS strips available to review but reportedly he had some ventricular arrhythmia requiring defibrillation.   Reported torsades based on notes.  He was intubated in the field   Reportedly prolonged downtime with CPR around 1 hour or so and then 20 minutes in the emergency room before obtaining ROSC.  I personally called the ER physician who performed the code Dr. Mtz. Per Dr. Mtz, there were episodes of bradycardia during the code as well as ventricular arrhythmia     EKG on arrival showed sinus bradycardia with left bundle branch block.  He was transferred to ICU.  Telemetry showed multiple episodes of high-grade AV block with multiple dropped beats.      SUBJECTIVE:  He is intubated and sedated.  Pupils are equal and reactive to light.  He is on propofol sedation so unable to assess neurological status.     Assessment and Plan     Principal Problem:    Cardiac arrest (HCC)  Active Problems:    History of transcatheter aortic valve replacement (TAVR)    Sinus bradycardia    High-grade atrioventricular block    Left bundle branch block    Acute respiratory failure with hypoxia (HCC)  Resolved Problems:    * No resolved hospital problems.  mg/min (25 1153)    sodium chloride      sodium chloride      DOBUTamine          propofol Last Rate: Stopped (25 1542)    EPINEPHrine Last Rate: 4 mcg/min (25 1456)    norepinephrine Last Rate: 25 mcg/min (25 1437)    [] amiodarone Last Rate: Stopped (25 1152) **FOLLOWED BY** amiodarone Last Rate: 0.5 mg/min (25 1153)    sodium chloride    sodium chloride    DOBUTamine     sodium chloride flush, 5-40 mL, IntraVENous, 2 times per day    enoxaparin, 40 mg, SubCUTAneous, Q24H         Sutart Sena MD    [facility] Cardiology  [address] [facility], [address] [address] [phone]      CC:Farrah Solis, PA

## 2025-02-26 NOTE — CODE DOCUMENTATION
Patient back in [facility].  Amiodorone drip started

## 2025-02-27 ENCOUNTER — APPOINTMENT (OUTPATIENT)
Facility: HOSPITAL | Age: 75
End: 2025-02-27
Payer: MEDICARE

## 2025-02-27 PROBLEM — R00.1 SINUS BRADYCARDIA: Status: ACTIVE | Noted: 2025-02-27

## 2025-02-27 LAB
ALBUMIN SERPL-MCNC: 2.9 G/DL (ref 3.5–5)
ALBUMIN SERPL-MCNC: 3 G/DL (ref 3.5–5)
ALBUMIN/GLOB SERPL: 1.2 (ref 1.1–2.2)
ALP SERPL-CCNC: 105 U/L (ref 45–117)
ALT SERPL-CCNC: 1012 U/L (ref 12–78)
ANION GAP SERPL CALC-SCNC: 6 MMOL/L (ref 2–12)
ANION GAP SERPL CALC-SCNC: 8 MMOL/L (ref 2–12)
ANION GAP SERPL CALC-SCNC: 9 MMOL/L (ref 2–12)
APTT PPP: 29.1 SEC (ref 22.1–31)
ARTERIAL PATENCY WRIST A: POSITIVE
ARTERIAL PATENCY WRIST A: YES
AST SERPL-CCNC: 1780 U/L (ref 15–37)
BACTERIA SPEC CULT: NORMAL
BASE DEFICIT BLD-SCNC: 3 MMOL/L
BASE DEFICIT BLDA-SCNC: 9.7 MMOL/L
BDY SITE: ABNORMAL
BDY SITE: ABNORMAL
BILIRUB DIRECT SERPL-MCNC: 0.3 MG/DL (ref 0–0.2)
BILIRUB SERPL-MCNC: 0.8 MG/DL (ref 0.2–1)
BUN SERPL-MCNC: 35 MG/DL (ref 6–20)
BUN SERPL-MCNC: 41 MG/DL (ref 6–20)
BUN SERPL-MCNC: 44 MG/DL (ref 6–20)
BUN/CREAT SERPL: 13 (ref 12–20)
BUN/CREAT SERPL: 13 (ref 12–20)
BUN/CREAT SERPL: 14 (ref 12–20)
CALCIUM SERPL-MCNC: 8.1 MG/DL (ref 8.5–10.1)
CALCIUM SERPL-MCNC: 8.2 MG/DL (ref 8.5–10.1)
CALCIUM SERPL-MCNC: 8.4 MG/DL (ref 8.5–10.1)
CHLORIDE SERPL-SCNC: 107 MMOL/L (ref 97–108)
CHLORIDE SERPL-SCNC: 108 MMOL/L (ref 97–108)
CHLORIDE SERPL-SCNC: 109 MMOL/L (ref 97–108)
CK SERPL-CCNC: 5445 U/L (ref 39–308)
CO2 SERPL-SCNC: 21 MMOL/L (ref 21–32)
CO2 SERPL-SCNC: 22 MMOL/L (ref 21–32)
CO2 SERPL-SCNC: 24 MMOL/L (ref 21–32)
CREAT SERPL-MCNC: 2.73 MG/DL (ref 0.7–1.3)
CREAT SERPL-MCNC: 3.19 MG/DL (ref 0.7–1.3)
CREAT SERPL-MCNC: 3.23 MG/DL (ref 0.7–1.3)
CREAT UR-MCNC: 130 MG/DL
EKG ATRIAL RATE: 67 BPM
EKG DIAGNOSIS: NORMAL
EKG DIAGNOSIS: NORMAL
EKG P AXIS: 77 DEGREES
EKG P-R INTERVAL: 112 MS
EKG Q-T INTERVAL: 472 MS
EKG Q-T INTERVAL: 484 MS
EKG QRS DURATION: 140 MS
EKG QRS DURATION: 162 MS
EKG QTC CALCULATION (BAZETT): 408 MS
EKG QTC CALCULATION (BAZETT): 511 MS
EKG R AXIS: -68 DEGREES
EKG R AXIS: 119 DEGREES
EKG T AXIS: 110 DEGREES
EKG T AXIS: 137 DEGREES
EKG VENTRICULAR RATE: 45 BPM
EKG VENTRICULAR RATE: 67 BPM
ERYTHROCYTE [DISTWIDTH] IN BLOOD BY AUTOMATED COUNT: 12 % (ref 11.5–14.5)
ERYTHROCYTE [DISTWIDTH] IN BLOOD BY AUTOMATED COUNT: 12.2 % (ref 11.5–14.5)
FIO2 ON VENT: 40 %
GAS FLOW.O2 O2 DELIVERY SYS: ABNORMAL
GAS FLOW.O2 SETTING OXYMISER: 20
GLOBULIN SER CALC-MCNC: 2.6 G/DL (ref 2–4)
GLUCOSE SERPL-MCNC: 186 MG/DL (ref 65–100)
GLUCOSE SERPL-MCNC: 190 MG/DL (ref 65–100)
GLUCOSE SERPL-MCNC: 206 MG/DL (ref 65–100)
HCO3 BLD-SCNC: 22.1 MMOL/L (ref 21–28)
HCO3 BLDA-SCNC: 15 MMOL/L (ref 22–26)
HCT VFR BLD AUTO: 38.2 % (ref 36.6–50.3)
HCT VFR BLD AUTO: 40.9 % (ref 36.6–50.3)
HGB BLD-MCNC: 12.6 G/DL (ref 12.1–17)
HGB BLD-MCNC: 13.4 G/DL (ref 12.1–17)
INR PPP: 1.4 (ref 0.9–1.1)
LACTATE SERPL-SCNC: 3.1 MMOL/L (ref 0.4–2)
LACTATE SERPL-SCNC: 5.6 MMOL/L (ref 0.4–2)
MAGNESIUM SERPL-MCNC: 2.7 MG/DL (ref 1.6–2.4)
MCH RBC QN AUTO: 32.1 PG (ref 26–34)
MCH RBC QN AUTO: 32.2 PG (ref 26–34)
MCHC RBC AUTO-ENTMCNC: 32.8 G/DL (ref 30–36.5)
MCHC RBC AUTO-ENTMCNC: 33 G/DL (ref 30–36.5)
MCV RBC AUTO: 97.4 FL (ref 80–99)
MCV RBC AUTO: 98.3 FL (ref 80–99)
NRBC # BLD: 0 K/UL (ref 0–0.01)
NRBC # BLD: 0 K/UL (ref 0–0.01)
NRBC BLD-RTO: 0 PER 100 WBC
NRBC BLD-RTO: 0 PER 100 WBC
O2/TOTAL GAS SETTING VFR VENT: 40 %
PCO2 BLD: 38.7 MMHG (ref 35–48)
PCO2 BLDA: 30 MMHG (ref 35–45)
PEEP RESPIRATORY: 5 CMH2O
PEEP RESPIRATORY: 6
PH BLD: 7.36 (ref 7.35–7.45)
PH BLDA: 7.31 (ref 7.35–7.45)
PHOSPHATE SERPL-MCNC: 6.1 MG/DL (ref 2.6–4.7)
PHOSPHATE SERPL-MCNC: 6.4 MG/DL (ref 2.6–4.7)
PLATELET # BLD AUTO: 192 K/UL (ref 150–400)
PLATELET # BLD AUTO: 196 K/UL (ref 150–400)
PMV BLD AUTO: 10.4 FL (ref 8.9–12.9)
PMV BLD AUTO: 10.6 FL (ref 8.9–12.9)
PO2 BLD: 113 MMHG (ref 83–108)
PO2 BLDA: 186 MMHG (ref 80–100)
POTASSIUM SERPL-SCNC: 5.2 MMOL/L (ref 3.5–5.1)
POTASSIUM SERPL-SCNC: 5.2 MMOL/L (ref 3.5–5.1)
POTASSIUM SERPL-SCNC: 5.3 MMOL/L (ref 3.5–5.1)
PRESSURE SUPPORT SETTING VENT: 5 CMH2O
PROT SERPL-MCNC: 5.6 G/DL (ref 6.4–8.2)
PROTHROMBIN TIME: 14.7 SEC (ref 9.2–11.2)
RBC # BLD AUTO: 3.92 M/UL (ref 4.1–5.7)
RBC # BLD AUTO: 4.16 M/UL (ref 4.1–5.7)
SAO2 % BLD: 98.3 % (ref 92–97)
SAO2 % BLD: 99 % (ref 92–97)
SAO2% DEVICE SAO2% SENSOR NAME: ABNORMAL
SERVICE CMNT-IMP: ABNORMAL
SERVICE CMNT-IMP: NORMAL
SODIUM SERPL-SCNC: 137 MMOL/L (ref 136–145)
SODIUM SERPL-SCNC: 138 MMOL/L (ref 136–145)
SODIUM SERPL-SCNC: 139 MMOL/L (ref 136–145)
SODIUM UR-SCNC: 12 MMOL/L
SPECIMEN SITE: ABNORMAL
SPECIMEN TYPE: ABNORMAL
THERAPEUTIC RANGE: NORMAL SECS (ref 58–77)
TROPONIN I SERPL HS-MCNC: ABNORMAL NG/L (ref 0–76)
UFH PPP CHRO-ACNC: 0.23 IU/ML
UFH PPP CHRO-ACNC: <0.1 IU/ML
UUN UR-MCNC: 209 MG/DL
VENTILATION MODE VENT: ABNORMAL
VT SETTING VENT: 400
WBC # BLD AUTO: 18 K/UL (ref 4.1–11.1)
WBC # BLD AUTO: 18.9 K/UL (ref 4.1–11.1)

## 2025-02-27 PROCEDURE — 2000000000 HC ICU R&B

## 2025-02-27 PROCEDURE — 86160 COMPLEMENT ANTIGEN: CPT

## 2025-02-27 PROCEDURE — 94660 CPAP INITIATION&MGMT: CPT

## 2025-02-27 PROCEDURE — 36600 WITHDRAWAL OF ARTERIAL BLOOD: CPT

## 2025-02-27 PROCEDURE — 85730 THROMBOPLASTIN TIME PARTIAL: CPT

## 2025-02-27 PROCEDURE — 84300 ASSAY OF URINE SODIUM: CPT

## 2025-02-27 PROCEDURE — 83516 IMMUNOASSAY NONANTIBODY: CPT

## 2025-02-27 PROCEDURE — 2500000003 HC RX 250 WO HCPCS: Performed by: HOSPITALIST

## 2025-02-27 PROCEDURE — 85027 COMPLETE CBC AUTOMATED: CPT

## 2025-02-27 PROCEDURE — 6360000002 HC RX W HCPCS: Performed by: PHYSICIAN ASSISTANT

## 2025-02-27 PROCEDURE — 6360000002 HC RX W HCPCS: Performed by: NURSE PRACTITIONER

## 2025-02-27 PROCEDURE — 83605 ASSAY OF LACTIC ACID: CPT

## 2025-02-27 PROCEDURE — 6360000002 HC RX W HCPCS: Performed by: EMERGENCY MEDICINE

## 2025-02-27 PROCEDURE — 83036 HEMOGLOBIN GLYCOSYLATED A1C: CPT

## 2025-02-27 PROCEDURE — 82550 ASSAY OF CK (CPK): CPT

## 2025-02-27 PROCEDURE — 84484 ASSAY OF TROPONIN QUANT: CPT

## 2025-02-27 PROCEDURE — 6370000000 HC RX 637 (ALT 250 FOR IP): Performed by: INTERNAL MEDICINE

## 2025-02-27 PROCEDURE — 84156 ASSAY OF PROTEIN URINE: CPT

## 2025-02-27 PROCEDURE — 80048 BASIC METABOLIC PNL TOTAL CA: CPT

## 2025-02-27 PROCEDURE — 80076 HEPATIC FUNCTION PANEL: CPT

## 2025-02-27 PROCEDURE — 84165 PROTEIN E-PHORESIS SERUM: CPT

## 2025-02-27 PROCEDURE — 83735 ASSAY OF MAGNESIUM: CPT

## 2025-02-27 PROCEDURE — 99291 CRITICAL CARE FIRST HOUR: CPT | Performed by: INTERNAL MEDICINE

## 2025-02-27 PROCEDURE — 2500000003 HC RX 250 WO HCPCS: Performed by: INTERNAL MEDICINE

## 2025-02-27 PROCEDURE — 82570 ASSAY OF URINE CREATININE: CPT

## 2025-02-27 PROCEDURE — 2580000003 HC RX 258: Performed by: PHYSICIAN ASSISTANT

## 2025-02-27 PROCEDURE — 74018 RADEX ABDOMEN 1 VIEW: CPT

## 2025-02-27 PROCEDURE — 84100 ASSAY OF PHOSPHORUS: CPT

## 2025-02-27 PROCEDURE — 6370000000 HC RX 637 (ALT 250 FOR IP): Performed by: PHYSICIAN ASSISTANT

## 2025-02-27 PROCEDURE — 2580000003 HC RX 258: Performed by: INTERNAL MEDICINE

## 2025-02-27 PROCEDURE — 82803 BLOOD GASES ANY COMBINATION: CPT

## 2025-02-27 PROCEDURE — 80069 RENAL FUNCTION PANEL: CPT

## 2025-02-27 PROCEDURE — 71045 X-RAY EXAM CHEST 1 VIEW: CPT

## 2025-02-27 PROCEDURE — 86037 ANCA TITER EACH ANTIBODY: CPT

## 2025-02-27 PROCEDURE — 93010 ELECTROCARDIOGRAM REPORT: CPT | Performed by: SPECIALIST

## 2025-02-27 PROCEDURE — 5A09357 ASSISTANCE WITH RESPIRATORY VENTILATION, LESS THAN 24 CONSECUTIVE HOURS, CONTINUOUS POSITIVE AIRWAY PRESSURE: ICD-10-PCS | Performed by: INTERNAL MEDICINE

## 2025-02-27 PROCEDURE — 86038 ANTINUCLEAR ANTIBODIES: CPT

## 2025-02-27 PROCEDURE — 89220 SPUTUM SPECIMEN COLLECTION: CPT

## 2025-02-27 PROCEDURE — 84540 ASSAY OF URINE/UREA-N: CPT

## 2025-02-27 PROCEDURE — 94003 VENT MGMT INPAT SUBQ DAY: CPT

## 2025-02-27 PROCEDURE — 85520 HEPARIN ASSAY: CPT

## 2025-02-27 PROCEDURE — 85610 PROTHROMBIN TIME: CPT

## 2025-02-27 PROCEDURE — 2500000003 HC RX 250 WO HCPCS: Performed by: PHYSICIAN ASSISTANT

## 2025-02-27 PROCEDURE — 36415 COLL VENOUS BLD VENIPUNCTURE: CPT

## 2025-02-27 PROCEDURE — 83521 IG LIGHT CHAINS FREE EACH: CPT

## 2025-02-27 PROCEDURE — 93005 ELECTROCARDIOGRAM TRACING: CPT | Performed by: PHYSICIAN ASSISTANT

## 2025-02-27 PROCEDURE — 2580000003 HC RX 258: Performed by: EMERGENCY MEDICINE

## 2025-02-27 RX ORDER — HEPARIN SODIUM 10000 [USP'U]/100ML
5-30 INJECTION, SOLUTION INTRAVENOUS CONTINUOUS
Status: DISCONTINUED | OUTPATIENT
Start: 2025-02-27 | End: 2025-02-27

## 2025-02-27 RX ORDER — HEPARIN SODIUM 1000 [USP'U]/ML
2000 INJECTION, SOLUTION INTRAVENOUS; SUBCUTANEOUS PRN
Status: DISCONTINUED | OUTPATIENT
Start: 2025-02-27 | End: 2025-02-27

## 2025-02-27 RX ORDER — LIDOCAINE 4 G/G
1 PATCH TOPICAL DAILY
Status: DISCONTINUED | OUTPATIENT
Start: 2025-02-27 | End: 2025-03-08 | Stop reason: HOSPADM

## 2025-02-27 RX ORDER — CLOPIDOGREL BISULFATE 75 MG/1
75 TABLET ORAL DAILY
Status: DISCONTINUED | OUTPATIENT
Start: 2025-02-27 | End: 2025-03-06

## 2025-02-27 RX ORDER — OXYCODONE HYDROCHLORIDE 5 MG/1
10 TABLET ORAL ONCE
Status: COMPLETED | OUTPATIENT
Start: 2025-02-27 | End: 2025-02-27

## 2025-02-27 RX ORDER — DEXMEDETOMIDINE HYDROCHLORIDE 4 UG/ML
.1-.8 INJECTION, SOLUTION INTRAVENOUS CONTINUOUS
Status: DISCONTINUED | OUTPATIENT
Start: 2025-02-27 | End: 2025-02-28

## 2025-02-27 RX ORDER — ASPIRIN 81 MG/1
81 TABLET, CHEWABLE ORAL DAILY
Status: DISCONTINUED | OUTPATIENT
Start: 2025-02-27 | End: 2025-03-06

## 2025-02-27 RX ORDER — LACTULOSE 10 G/15ML
10 SOLUTION ORAL 3 TIMES DAILY
Status: DISCONTINUED | OUTPATIENT
Start: 2025-02-27 | End: 2025-03-07

## 2025-02-27 RX ORDER — HEPARIN SODIUM 1000 [USP'U]/ML
4000 INJECTION, SOLUTION INTRAVENOUS; SUBCUTANEOUS PRN
Status: DISCONTINUED | OUTPATIENT
Start: 2025-02-27 | End: 2025-02-27

## 2025-02-27 RX ADMIN — OXYCODONE HYDROCHLORIDE 10 MG: 5 TABLET ORAL at 13:11

## 2025-02-27 RX ADMIN — SODIUM CHLORIDE, PRESERVATIVE FREE 10 ML: 5 INJECTION INTRAVENOUS at 21:48

## 2025-02-27 RX ADMIN — AMIODARONE HYDROCHLORIDE 0.5 MG/MIN: 50 INJECTION, SOLUTION INTRAVENOUS at 12:52

## 2025-02-27 RX ADMIN — LACTULOSE 10 G: 10 SOLUTION ORAL at 20:32

## 2025-02-27 RX ADMIN — SODIUM BICARBONATE: 84 INJECTION, SOLUTION INTRAVENOUS at 03:00

## 2025-02-27 RX ADMIN — PROPOFOL 10 MCG/KG/MIN: 10 INJECTION, EMULSION INTRAVENOUS at 00:47

## 2025-02-27 RX ADMIN — SODIUM CHLORIDE, PRESERVATIVE FREE 10 ML: 5 INJECTION INTRAVENOUS at 09:15

## 2025-02-27 RX ADMIN — DEXMEDETOMIDINE HYDROCHLORIDE 0.2 MCG/KG/HR: 400 INJECTION INTRAVENOUS at 12:18

## 2025-02-27 RX ADMIN — HEPARIN SODIUM 11 UNITS/KG/HR: 10000 INJECTION, SOLUTION INTRAVENOUS at 02:28

## 2025-02-27 RX ADMIN — FAMOTIDINE 20 MG: 10 INJECTION, SOLUTION INTRAVENOUS at 12:21

## 2025-02-27 RX ADMIN — SODIUM ZIRCONIUM CYCLOSILICATE 10 G: 10 POWDER, FOR SUSPENSION ORAL at 05:41

## 2025-02-27 RX ADMIN — DOBUTAMINE HYDROCHLORIDE 2.5 MCG/KG/MIN: 200 INJECTION INTRAVENOUS at 12:35

## 2025-02-27 ASSESSMENT — PULMONARY FUNCTION TESTS
PIF_VALUE: 17
PIF_VALUE: 14
PIF_VALUE: 16

## 2025-02-27 NOTE — PROGRESS NOTES
attended rounds in ICU as part of interdisciplinary team where patient's ongoing care was discussed. Please contact [facility] for further referrals.    Rev Dianna Helton MDiv, B[facility]  To andreina gresham: [phone])

## 2025-02-27 NOTE — PROGRESS NOTES
1100 Cardiology cleared to try to extubate pt  1117 Heparin stopped  1239 Epinephrine switched to dobutamine  1220 Propofol switched to Precedex  1251 SBT started  Weaning off fentanyl to switch to oxycodone  1426 extubated to bipap.  1429 Precedex stopped  1445 amio stopped  1630 [place] screening completed: passed  1900 shift report given to oncoming night shift nurse

## 2025-02-27 NOTE — PROGRESS NOTES
Spiritual Health History and Assessment/Progress Note  [facility]    Initial Encounter,  , Adjustment to illness,      Name: Jeb Pereira MRN: [id number]    Age: 74 y.o.     Sex: male   Language: English   Samaritan: Other   Cardiac arrest (HCC)     Date: 2/27/2025            Total Time Calculated: 9 min              Spiritual Assessment began in SF A4 INTENSIVE CARE UNIT        Referral/Consult From: Rounding   Encounter Overview/Reason: Initial Encounter  Service Provided For: Patient and family together    Sakina, Belief, Meaning:   Patient identifies as spiritual  Family/Friends identify as spiritual      Importance and Influence:  Patient has spiritual/personal beliefs that influence decisions regarding their health  Family/Friends have spiritual/personal beliefs that influence decisions regarding the patient's health    Community:  Patient feels well-supported. Support system includes: Spouse/Partner and Children  Family/Friends feel well-supported. Support system includes: Children    Assessment and Plan of Care:     Patient Interventions include: Other: Provided spiritual presence to Mr Pereira, who was lying quietly in bed, on vent support.  Patient did not respond during visit.  Family/Friends Interventions include: Facilitated expression of thoughts and feelings, Explored spiritual coping/struggle/distress, and Other: Patient's wife, son and another family member were at his bedside; they were smiling and appeared in good spirits. Provided spiritual presence and active listening as family shared that they felt patient was heading in the right direction; denied having any concerns to share. Wife stated they were not affiliated with any Catholic/sakina community. They accepted 's offer to keep patient & family in prayer.  Assured them of ongoing  availability for support.    Patient Plan of Care: Spiritual Care available upon further referral  Family/Friends Plan of Care:

## 2025-02-27 NOTE — CONSULTS
[facility]    Jeb Pereira  YOB: 1950          Assessment & Plan:     Oliguric ABRAHAM presumed secondary to ATN  S/p Bradycardic arrest  Lactic acidosis  Resp failure  S/p TAVR 2/18    Rec:  Supportive care  IVF, pressors, optimize cardiac management  Avoid nephrotoxins  Check urine lytes and serologies  Repeat labs  Likely will need RRT by tomorrow if not showing signs of recovery. D/w family.       Subjective:   CC: ABRAHAM  HPI: 74 WM is seen for ABRAHAM. He had a TAVR at [facility] last week and was admitted to ICU post bradycardic arrest. He received prolonged CPR and is now on vent and has temporary pacer. He has been following commands. UOP is 10/hr and Cr is rising to 2.7. There is no known prior h/o CKD.   ROS: unable to obtain  PMH: AS s/p TAVR  SH:  nonsmoker  Current Facility-Administered Medications   Medication Dose Route Frequency    heparin (porcine) injection 4,000 Units  4,000 Units IntraVENous PRN    heparin (porcine) injection 2,000 Units  2,000 Units IntraVENous PRN    heparin 25,000 units in dextrose 5% 250 mL (premix) infusion  5-30 Units/kg/hr IntraVENous Continuous    sodium bicarbonate 150 mEq in dextrose 5 % 1,000 mL infusion   IntraVENous Continuous    propofol infusion  5-50 mcg/kg/min IntraVENous Continuous    EPINEPHrine 5 mg in sodium chloride 0.9 % 250 mL infusion  1-20 mcg/min IntraVENous Continuous    [Held by provider] norepinephrine (LEVOPHED) 16 mg in sodium chloride 0.9 % 250 mL infusion (premix)  1-100 mcg/min IntraVENous Continuous    amiodarone (CORDARONE) 450 mg in dextrose 5 % 250 mL infusion ([id number])  0.5 mg/min IntraVENous Continuous    sodium chloride flush 0.9 % injection 5-40 mL  5-40 mL IntraVENous 2 times per day    sodium chloride flush 0.9 % injection 5-40 mL  5-40 mL IntraVENous PRN    0.9 % sodium chloride infusion   IntraVENous PRN    potassium chloride 20 mEq/50 mL IVPB (Central Line)  20 mEq

## 2025-02-27 NOTE — CONSULTS
[facility] CARDIOLOGY  Cardiology Care Note                  []Initial visit     [x]Established visit     Patient Name: Jeb Pereira - :1950 - MRN:[id number]  Primary Cardiologist: None DR. Gomez S  Consulting Cardiologist: Anish Manuel MD     Reason for initial visit: cardiac arrest     HPI:    Patient is a 74-year-old male who is status post TAVR approximately 1 week ago at [facility] (Dr. Long).  Per family, patient has been doing well until this morning when daughter states that he had a fall and she had him fall.  Initially when she went to his side, he was able to talk but then passed out.  By the time 911 had been called and CPR/drugs were initiated.  ROSC was obtained.  Patient came to the ED.  Patient had to be resuscitated again in the ED-VT/V-fib also noted.  ROSC obtained.  Prolonged downtime (approximately 60 to 90 minutes).     As per Care Everywhere notes-patient had TAVR and had a trans venous temporary pacemaker for a day.  Per family-patient had heart catheterization prior to TAVR and was told that he did not have any significant blockages.  There is also mention of left bundle branch block in prior notes.    Now intubated/sedated with propofol.  Requiring epi gtt 2 mcg, Levophed gtt and Amio gtt 0.5 mg/min.  Per ER reports, pt had VT again while in ER.    Down time is approximated around 1 hr-1.5 hrs.    Head CT non-contrast, negative for acute process.  Pt has extensive bruising to left and back side of head.      In ER,  pt noted to have CHF with AV dissociation.      Pt is s/p TAVR 1 week at [facility] by Dr. Long/Dr. Gomez.  Per care everywhere, it was noted pt did develop LBBB intraprocedure and TVP was placed.   Per notes, pt did not require pacing and wire was removed POD1.       Wife reports LHC 2 weeks prior to TAVr, with reports of NO CAD.       Pt responding to verbal commands        difficult study due to patient's heart rhythm and procedure performed with the patient in a supine position.    Signed by: Zak Barnard DO on 2/26/2025  3:47 PM          Most recent HS troponins:  Recent Labs     02/26/25  2351 02/27/25  0533 02/27/25  1044   TROPHS 18,407* 14,437* 11,488*       ECG:  SB-SR with wide QRS, AV dissociation on telemetry     Review of Systems:    []All other systems reviewed and all negative except as written in HPI    [x] Patient unable to provide secondary to condition    No past medical history on file.  No past surgical history on file.  Social Hx:    Family Hx: family history is not on file.  Not on File       OBJECTIVE:  Wt Readings from Last 3 Encounters:   02/26/25 83.9 kg (185 lb)     Net IO Since Admission: 899.46 mL [02/27/25 1234]     Physical Exam:    Vitals:   Vitals:    02/27/25 0930 02/27/25 0945 02/27/25 1000 02/27/25 1147   BP: 114/72 104/79 115/68    Pulse: 62 62 61 64   Resp: 12 10 11 (!) 9   Temp:       TempSrc:       SpO2: 100% 100% 100% 100%   Weight:       Height:    1.829 m (6' 0.01\")     Telemetry:    SB-SR with wide QRS, AV dissociation on telemetry     Gen: Intubated/sedated -- extensive bruising to left/back of head  Neck: Supple, No JVD, No Carotid Bruit  Resp: No accessory muscle use, Clear breath sounds anteriorly  Card: Regular Rate,Rhythm, Normal S1, S2, 2/6 systolic murmur present  Abd:   Soft, BS+   MSK: No cyanosis  Skin: No rashes    Neuro: intubated  Psych:    LE: No edema          Data Review:     Radiology:   XR Results (most recent):  CT Result (most recent):  CT HEAD WO CONTRAST 02/26/2025    Narrative  INDICATION:   rosc    EXAM:  HEAD CT WITHOUT CONTRAST    COMPARISON:  None    TECHNIQUE:  Routine noncontrast axial head CT was performed. Coronal and  sagittal multiplanar reconstructions were obtained.  CT dose reduction was  achieved through use of a standardized protocol tailored for this examination  and automatic exposure control for dose

## 2025-02-27 NOTE — PROGRESS NOTES
Amari Pope at bedside during night. Pt tolerated being fully weaned off Levo at 2337. Advised during rounds to DC propofol and pt became responsive and followed commands for the remainder of shift. Propofol reinitiated later at low rate due to mild  agitation. Pain well controlled with ordered Fentanyl, no PRNs were required. Minimal ectomy seen. Low urine output, PA Sarah aware. Trending trops, lactate and first Anti-Xa post heparin gtt start due at 0815.

## 2025-02-27 NOTE — PROGRESS NOTES
**SBT not tried at this time per MD. Would like to do some further testing and not stress the heart with an SBT.    1257- SBT initiated 5/5,30%     02/27/25 0814   Adult IBW   Height 1.829 m (6' 0.01\")   IBW/kg (Calculated) 77.62   Patient Observation   ETCO2 (mmHg) 35 mmHg   Breath Sounds   Respiratory Pattern Regular   Upper Airway Sounds Other (comment)  (wnl)   Breath Sounds Bilateral Clear   Right Upper Lobe Clear   Right Middle Lobe Clear   Right Lower Lobe Clear   Left Upper Lobe Clear   Left Lower Lobe Clear   Vent Information   Ventilator ID [id number]   Ventilator Safety Check Performed Pre-Use Yes   Vent Mode AC/PRVC   Ventilator Settings   FiO2  40 %   Insp Time (sec) 0.9 sec   Resp Rate (Set) 12 bpm   Target Vt 400   PEEP/CPAP (cmH2O) 5   Vent Patient Data (Readings)   Vt Mandatory Exp (mL) 645 mL   Vt (Measured) 645 mL   Peak Inspiratory Pressure (cmH2O) 14 cmH2O   Rate Measured 17 br/min   Minute Volume (L/min) 5.9 Liters   Mean Airway Pressure (cmH2O) 6.8 cmH20   Plateau Pressure (cm H2O) 8 cm H2O   Driving Pressure 3   Inspiratory Time 0.9 sec   I:E Ratio 1:4.5   Flow Sensitivity 2 L/min   PEEP Intrinsic (cm H2O) 0 cm H2O   Static Compliance (L/cm H2O) 47   I Time/ I Time % 0.9 s   Backup Apnea On   Backup Rate 12 Breaths Per Minute   Backup Vt 400   Vent Alarm Settings   High Pressure (cmH2O) 40 cmH2O   Low Minute Volume (lpm) 2 L/min   High Minute Volume (lpm) 20 L/min   RR Low (bpm) 5   RR High (bpm) 45 br/min   Apnea (secs) 20 secs   Additional Respiratoray Assessments   Humidification Source Heated wire   Humidification Temp 37   Circuit Condensation Drained   Ambu Bag With Mask At Bedside Yes   Airway Clearance   Suction ET Tube   Subglottic Suction Done No   Suction Device Inline suction catheter   Suction Catheter Size 14 Fr   Sputum Method Obtained Endotracheal   Sputum Amount Small   Sputum Color/Odor Brown;Bhandari   Sputum Consistency Thick   $Obtained Sample $Induced Sputum (charge not used for

## 2025-02-27 NOTE — PROGRESS NOTES
Critical Care Progress Note  Angeline Cavanaugh MD          Date of Service:  2025  NAME:  Jeb Pereira  :  1950  MRN:  [id number]      Subjective/Hospital course:      History is limited, obtained from wife/ daughter at bedside and EMR review.   Patient had underwent TAVR at [facility] last week with prior normal LHC and was discharged home on DAPT that he has been compliant to. Wife reports no other medication and patient has been doing fine until this AM when she heard her  falling and found him on the ground. She called 911. Shortly before EMS arrived, patient became unresponsive, pulseless and cyanosed. EMS started CPR and continued for almost an hour in field with shockable rhythm and suspicion of torsades. Patient was shocked multiple times and brought in ER where CPR continued and total CPR time around 80-90 mins.   EKG in ER is showing bradycardia with wide complex, junctional rhythm. Patient was started on Epi and Levophed added.   CT head with no bleeding. ICU service was called for ICU admission.   On my initial evaluation in ER, patient was on 20 mcg of levophed and 4 mcg of Epi with amiodarone eat rate of 1 mg per minute.   Patient on no sedation, not responsive but breathing over the vent.   Stat echo and cardiology consult placed. Echo reported as reduced LV function and EF 18%, grade 3 DD, concentric LVH and increased LV mass, highly suspicious for infiltrative CMP.    On arrival in ICU, failed to wean epi and patient became hypotensive with intermittent episodes of bradycardia and Epi drip was started back.   Patient was evaluated by cardiology and in evening taken to cath lab for pacemaker insertion.       - more alert. Able to follow simple command. Epi at 3       Assessment/Plan:   # Sudden Cardiac arrest, at home. Unclear down time (mutiple different timelines noted in the history) when patient achieved ROSC and lost the pulse again. Multiple

## 2025-02-27 NOTE — CARE COORDINATION
2/27/25  4:15 PM    Care Management Progress Note    Reason for Admission:   Cardiac arrest (HCC) [I46.9]  Procedure(s) (LRB):  Insert temporary pacemaker (N/A)  Ultrasound guided vascular access (N/A)  1 Day Post-Op    Patient Admission Status: Inpatient  RUR: 10%  Hospitalization in the last 30 days (Readmission):  No        Transition of care plan:  Ongoing medical management:  Cardiology, nephrology consulted  Discharge plan: CM following for needs  Discharge plan communicated with patient and/or discharge caregiver: No    Date 1st IMM letter given: Not yet received- pt access has been notified  Outpatient follow-up.  Transport at discharge: DOMO Lockwood, MSW  [facility]      Available via [employer]

## 2025-02-28 ENCOUNTER — APPOINTMENT (OUTPATIENT)
Facility: HOSPITAL | Age: 75
End: 2025-02-28
Attending: INTERNAL MEDICINE
Payer: MEDICARE

## 2025-02-28 ENCOUNTER — APPOINTMENT (OUTPATIENT)
Facility: HOSPITAL | Age: 75
End: 2025-02-28
Payer: MEDICARE

## 2025-02-28 PROBLEM — I42.9 CARDIOMYOPATHY (HCC): Status: ACTIVE | Noted: 2025-02-28

## 2025-02-28 PROBLEM — N17.0 ATN (ACUTE TUBULAR NECROSIS): Status: ACTIVE | Noted: 2025-02-28

## 2025-02-28 LAB
ALBUMIN SERPL-MCNC: 2.9 G/DL (ref 3.5–5)
ALBUMIN/GLOB SERPL: 1.1 (ref 1.1–2.2)
ALP SERPL-CCNC: 102 U/L (ref 45–117)
ALT SERPL-CCNC: 997 U/L (ref 12–78)
ANION GAP SERPL CALC-SCNC: 10 MMOL/L (ref 2–12)
ANION GAP SERPL CALC-SCNC: 7 MMOL/L (ref 2–12)
APTT PPP: 28.6 SEC (ref 22.1–31)
AST SERPL-CCNC: 1007 U/L (ref 15–37)
BILIRUB SERPL-MCNC: 0.9 MG/DL (ref 0.2–1)
BUN SERPL-MCNC: 55 MG/DL (ref 6–20)
BUN SERPL-MCNC: 63 MG/DL (ref 6–20)
BUN/CREAT SERPL: 13 (ref 12–20)
BUN/CREAT SERPL: 14 (ref 12–20)
CALCIUM SERPL-MCNC: 8.3 MG/DL (ref 8.5–10.1)
CALCIUM SERPL-MCNC: 8.5 MG/DL (ref 8.5–10.1)
CHLORIDE SERPL-SCNC: 108 MMOL/L (ref 97–108)
CHLORIDE SERPL-SCNC: 109 MMOL/L (ref 97–108)
CK SERPL-CCNC: 6201 U/L (ref 39–308)
CO2 SERPL-SCNC: 19 MMOL/L (ref 21–32)
CO2 SERPL-SCNC: 22 MMOL/L (ref 21–32)
CREAT SERPL-MCNC: 4.19 MG/DL (ref 0.7–1.3)
CREAT SERPL-MCNC: 4.56 MG/DL (ref 0.7–1.3)
CREAT UR-MCNC: 129 MG/DL
ECHO BSA: 2.06 M2
ECHO LV EDV A2C: 157 ML
ECHO LV EDV A4C: 147 ML
ECHO LV EDV BP: 158 ML (ref 67–155)
ECHO LV EDV INDEX A4C: 71 ML/M2
ECHO LV EDV INDEX BP: 77 ML/M2
ECHO LV EDV NDEX A2C: 76 ML/M2
ECHO LV EF PHYSICIAN: 50 %
ECHO LV EJECTION FRACTION A2C: 53 %
ECHO LV EJECTION FRACTION A4C: 49 %
ECHO LV EJECTION FRACTION BIPLANE: 50 % (ref 55–100)
ECHO LV ESV A2C: 74 ML
ECHO LV ESV A4C: 75 ML
ECHO LV ESV BP: 79 ML (ref 22–58)
ECHO LV ESV INDEX A2C: 36 ML/M2
ECHO LV ESV INDEX A4C: 36 ML/M2
ECHO LV ESV INDEX BP: 38 ML/M2
ECHO LV FRACTIONAL SHORTENING: 18 % (ref 28–44)
ECHO LV GLOBAL LONGITUDINAL STRAIN (GLS): -11.4 %
ECHO LV GLOBAL LONGITUDINAL STRAIN (GLS): -11.8 %
ECHO LV GLOBAL LONGITUDINAL STRAIN (GLS): -12 %
ECHO LV GLOBAL LONGITUDINAL STRAIN (GLS): -12.9 %
ECHO LV INTERNAL DIMENSION DIASTOLE INDEX: 2.48 CM/M2
ECHO LV INTERNAL DIMENSION DIASTOLIC: 5.1 CM (ref 4.2–5.9)
ECHO LV INTERNAL DIMENSION SYSTOLIC INDEX: 2.04 CM/M2
ECHO LV INTERNAL DIMENSION SYSTOLIC: 4.2 CM
ECHO LV IVSD: 1 CM (ref 0.6–1)
ECHO LV MASS 2D: 241.2 G (ref 88–224)
ECHO LV MASS INDEX 2D: 117.1 G/M2 (ref 49–115)
ECHO LV POSTERIOR WALL DIASTOLIC: 1.4 CM (ref 0.6–1)
ECHO LV RELATIVE WALL THICKNESS RATIO: 0.55
ECHO TV REGURGITANT MAX VELOCITY: 3.14 M/S
ECHO TV REGURGITANT PEAK GRADIENT: 39 MMHG
EKG ATRIAL RATE: 80 BPM
EKG DIAGNOSIS: NORMAL
EKG P AXIS: 74 DEGREES
EKG P-R INTERVAL: 196 MS
EKG Q-T INTERVAL: 458 MS
EKG QRS DURATION: 138 MS
EKG QTC CALCULATION (BAZETT): 528 MS
EKG R AXIS: 31 DEGREES
EKG T AXIS: 154 DEGREES
EKG VENTRICULAR RATE: 80 BPM
ERYTHROCYTE [DISTWIDTH] IN BLOOD BY AUTOMATED COUNT: 12 % (ref 11.5–14.5)
ERYTHROCYTE [DISTWIDTH] IN BLOOD BY AUTOMATED COUNT: 12.1 % (ref 11.5–14.5)
EST. AVERAGE GLUCOSE BLD GHB EST-MCNC: 108 MG/DL
GBM IGG SER-ACNC: <0.2 UNITS (ref 0–0.9)
GLOBULIN SER CALC-MCNC: 2.7 G/DL (ref 2–4)
GLUCOSE BLD STRIP.AUTO-MCNC: 107 MG/DL (ref 65–117)
GLUCOSE BLD STRIP.AUTO-MCNC: 119 MG/DL (ref 65–117)
GLUCOSE BLD STRIP.AUTO-MCNC: 119 MG/DL (ref 65–117)
GLUCOSE BLD STRIP.AUTO-MCNC: 128 MG/DL (ref 65–117)
GLUCOSE SERPL-MCNC: 123 MG/DL (ref 65–100)
GLUCOSE SERPL-MCNC: 129 MG/DL (ref 65–100)
HBA1C MFR BLD: 5.4 % (ref 4–5.6)
HCT VFR BLD AUTO: 32.1 % (ref 36.6–50.3)
HCT VFR BLD AUTO: 33.6 % (ref 36.6–50.3)
HGB BLD-MCNC: 10.7 G/DL (ref 12.1–17)
HGB BLD-MCNC: 11 G/DL (ref 12.1–17)
INR PPP: 1.3 (ref 0.9–1.1)
KAPPA LC FREE SER-MCNC: 32.3 MG/L (ref 3.3–19.4)
KAPPA LC FREE/LAMBDA FREE SER: 2.11 (ref 0.26–1.65)
LACTATE SERPL-SCNC: 1.4 MMOL/L (ref 0.4–2)
LAMBDA LC FREE SERPL-MCNC: 15.3 MG/L (ref 5.7–26.3)
MAGNESIUM SERPL-MCNC: 2.8 MG/DL (ref 1.6–2.4)
MAGNESIUM SERPL-MCNC: 2.8 MG/DL (ref 1.6–2.4)
MCH RBC QN AUTO: 32.1 PG (ref 26–34)
MCH RBC QN AUTO: 32.1 PG (ref 26–34)
MCHC RBC AUTO-ENTMCNC: 32.7 G/DL (ref 30–36.5)
MCHC RBC AUTO-ENTMCNC: 33.3 G/DL (ref 30–36.5)
MCV RBC AUTO: 96.4 FL (ref 80–99)
MCV RBC AUTO: 98 FL (ref 80–99)
NRBC # BLD: 0 K/UL (ref 0–0.01)
NRBC # BLD: 0 K/UL (ref 0–0.01)
NRBC BLD-RTO: 0 PER 100 WBC
NRBC BLD-RTO: 0 PER 100 WBC
PHOSPHATE SERPL-MCNC: 6.5 MG/DL (ref 2.6–4.7)
PHOSPHATE SERPL-MCNC: 6.6 MG/DL (ref 2.6–4.7)
PLATELET # BLD AUTO: 144 K/UL (ref 150–400)
PLATELET # BLD AUTO: 150 K/UL (ref 150–400)
PMV BLD AUTO: 10.4 FL (ref 8.9–12.9)
PMV BLD AUTO: 10.6 FL (ref 8.9–12.9)
POTASSIUM SERPL-SCNC: 4.9 MMOL/L (ref 3.5–5.1)
POTASSIUM SERPL-SCNC: 5 MMOL/L (ref 3.5–5.1)
PROT SERPL-MCNC: 5.6 G/DL (ref 6.4–8.2)
PROT UR-MCNC: 373 MG/DL (ref 0–11.9)
PROT/CREAT UR-RTO: 2.9
PROTHROMBIN TIME: 13.9 SEC (ref 9.2–11.2)
RBC # BLD AUTO: 3.33 M/UL (ref 4.1–5.7)
RBC # BLD AUTO: 3.43 M/UL (ref 4.1–5.7)
SERVICE CMNT-IMP: ABNORMAL
SERVICE CMNT-IMP: NORMAL
SODIUM SERPL-SCNC: 137 MMOL/L (ref 136–145)
SODIUM SERPL-SCNC: 138 MMOL/L (ref 136–145)
THERAPEUTIC RANGE: NORMAL SECS (ref 58–77)
UFH PPP CHRO-ACNC: <0.1 IU/ML
WBC # BLD AUTO: 15.4 K/UL (ref 4.1–11.1)
WBC # BLD AUTO: 18.3 K/UL (ref 4.1–11.1)

## 2025-02-28 PROCEDURE — 99291 CRITICAL CARE FIRST HOUR: CPT | Performed by: INTERNAL MEDICINE

## 2025-02-28 PROCEDURE — 93308 TTE F-UP OR LMTD: CPT | Performed by: STUDENT IN AN ORGANIZED HEALTH CARE EDUCATION/TRAINING PROGRAM

## 2025-02-28 PROCEDURE — 6360000002 HC RX W HCPCS: Performed by: INTERNAL MEDICINE

## 2025-02-28 PROCEDURE — 85520 HEPARIN ASSAY: CPT

## 2025-02-28 PROCEDURE — 93356 MYOCRD STRAIN IMG SPCKL TRCK: CPT | Performed by: STUDENT IN AN ORGANIZED HEALTH CARE EDUCATION/TRAINING PROGRAM

## 2025-02-28 PROCEDURE — 93010 ELECTROCARDIOGRAM REPORT: CPT | Performed by: SPECIALIST

## 2025-02-28 PROCEDURE — 87340 HEPATITIS B SURFACE AG IA: CPT

## 2025-02-28 PROCEDURE — 82550 ASSAY OF CK (CPK): CPT

## 2025-02-28 PROCEDURE — 90935 HEMODIALYSIS ONE EVALUATION: CPT

## 2025-02-28 PROCEDURE — 93325 DOPPLER ECHO COLOR FLOW MAPG: CPT | Performed by: STUDENT IN AN ORGANIZED HEALTH CARE EDUCATION/TRAINING PROGRAM

## 2025-02-28 PROCEDURE — 36556 INSERT NON-TUNNEL CV CATH: CPT

## 2025-02-28 PROCEDURE — 2580000003 HC RX 258: Performed by: INTERNAL MEDICINE

## 2025-02-28 PROCEDURE — 94761 N-INVAS EAR/PLS OXIMETRY MLT: CPT

## 2025-02-28 PROCEDURE — 2500000003 HC RX 250 WO HCPCS: Performed by: HOSPITALIST

## 2025-02-28 PROCEDURE — 84100 ASSAY OF PHOSPHORUS: CPT

## 2025-02-28 PROCEDURE — 94660 CPAP INITIATION&MGMT: CPT

## 2025-02-28 PROCEDURE — 83605 ASSAY OF LACTIC ACID: CPT

## 2025-02-28 PROCEDURE — 71045 X-RAY EXAM CHEST 1 VIEW: CPT

## 2025-02-28 PROCEDURE — 83735 ASSAY OF MAGNESIUM: CPT

## 2025-02-28 PROCEDURE — 85730 THROMBOPLASTIN TIME PARTIAL: CPT

## 2025-02-28 PROCEDURE — 80053 COMPREHEN METABOLIC PANEL: CPT

## 2025-02-28 PROCEDURE — 2700000000 HC OXYGEN THERAPY PER DAY

## 2025-02-28 PROCEDURE — 6360000002 HC RX W HCPCS: Performed by: PHYSICIAN ASSISTANT

## 2025-02-28 PROCEDURE — 6370000000 HC RX 637 (ALT 250 FOR IP): Performed by: INTERNAL MEDICINE

## 2025-02-28 PROCEDURE — 86706 HEP B SURFACE ANTIBODY: CPT

## 2025-02-28 PROCEDURE — 99233 SBSQ HOSP IP/OBS HIGH 50: CPT | Performed by: INTERNAL MEDICINE

## 2025-02-28 PROCEDURE — 93356 MYOCRD STRAIN IMG SPCKL TRCK: CPT

## 2025-02-28 PROCEDURE — 93321 DOPPLER ECHO F-UP/LMTD STD: CPT | Performed by: STUDENT IN AN ORGANIZED HEALTH CARE EDUCATION/TRAINING PROGRAM

## 2025-02-28 PROCEDURE — 2500000003 HC RX 250 WO HCPCS: Performed by: INTERNAL MEDICINE

## 2025-02-28 PROCEDURE — 2000000000 HC ICU R&B

## 2025-02-28 PROCEDURE — 6360000002 HC RX W HCPCS: Performed by: NURSE PRACTITIONER

## 2025-02-28 PROCEDURE — 36415 COLL VENOUS BLD VENIPUNCTURE: CPT

## 2025-02-28 PROCEDURE — 6360000002 HC RX W HCPCS: Performed by: HOSPITALIST

## 2025-02-28 PROCEDURE — 82962 GLUCOSE BLOOD TEST: CPT

## 2025-02-28 PROCEDURE — 02HV33Z INSERTION OF INFUSION DEVICE INTO SUPERIOR VENA CAVA, PERCUTANEOUS APPROACH: ICD-10-PCS | Performed by: INTERNAL MEDICINE

## 2025-02-28 PROCEDURE — 85610 PROTHROMBIN TIME: CPT

## 2025-02-28 PROCEDURE — 85027 COMPLETE CBC AUTOMATED: CPT

## 2025-02-28 RX ORDER — HEPARIN SODIUM 1000 [USP'U]/ML
4000 INJECTION, SOLUTION INTRAVENOUS; SUBCUTANEOUS ONCE
Status: COMPLETED | OUTPATIENT
Start: 2025-02-28 | End: 2025-02-28

## 2025-02-28 RX ORDER — HEPARIN SODIUM 10000 [USP'U]/100ML
5-30 INJECTION, SOLUTION INTRAVENOUS CONTINUOUS
Status: DISCONTINUED | OUTPATIENT
Start: 2025-02-28 | End: 2025-03-05

## 2025-02-28 RX ORDER — HEPARIN SODIUM 1000 [USP'U]/ML
2000 INJECTION, SOLUTION INTRAVENOUS; SUBCUTANEOUS PRN
Status: DISCONTINUED | OUTPATIENT
Start: 2025-02-28 | End: 2025-03-05

## 2025-02-28 RX ORDER — DOBUTAMINE HYDROCHLORIDE 200 MG/100ML
2.5-1 INJECTION INTRAVENOUS CONTINUOUS
Status: DISCONTINUED | OUTPATIENT
Start: 2025-02-28 | End: 2025-02-28

## 2025-02-28 RX ORDER — HEPARIN SODIUM 1000 [USP'U]/ML
4000 INJECTION, SOLUTION INTRAVENOUS; SUBCUTANEOUS PRN
Status: DISCONTINUED | OUTPATIENT
Start: 2025-02-28 | End: 2025-03-05

## 2025-02-28 RX ORDER — INSULIN LISPRO 100 [IU]/ML
0-4 INJECTION, SOLUTION INTRAVENOUS; SUBCUTANEOUS EVERY 6 HOURS SCHEDULED
Status: DISCONTINUED | OUTPATIENT
Start: 2025-02-28 | End: 2025-03-02

## 2025-02-28 RX ORDER — DEXTROSE MONOHYDRATE 100 MG/ML
INJECTION, SOLUTION INTRAVENOUS CONTINUOUS PRN
Status: DISCONTINUED | OUTPATIENT
Start: 2025-02-28 | End: 2025-03-08 | Stop reason: HOSPADM

## 2025-02-28 RX ADMIN — SODIUM CHLORIDE, PRESERVATIVE FREE 10 ML: 5 INJECTION INTRAVENOUS at 19:47

## 2025-02-28 RX ADMIN — CLOPIDOGREL BISULFATE 75 MG: 75 TABLET ORAL at 07:59

## 2025-02-28 RX ADMIN — DEXTROSE 150 MG: 50 INJECTION, SOLUTION INTRAVENOUS at 11:31

## 2025-02-28 RX ADMIN — ONDANSETRON 4 MG: 2 INJECTION, SOLUTION INTRAMUSCULAR; INTRAVENOUS at 05:12

## 2025-02-28 RX ADMIN — AMIODARONE HYDROCHLORIDE 1 MG/MIN: 50 INJECTION, SOLUTION INTRAVENOUS at 11:53

## 2025-02-28 RX ADMIN — ASPIRIN 81 MG: 81 TABLET, CHEWABLE ORAL at 07:59

## 2025-02-28 RX ADMIN — HEPARIN SODIUM 4000 UNITS: 1000 INJECTION, SOLUTION INTRAVENOUS; SUBCUTANEOUS at 16:30

## 2025-02-28 RX ADMIN — HEPARIN SODIUM 12 UNITS/KG/HR: 10000 INJECTION, SOLUTION INTRAVENOUS at 16:41

## 2025-02-28 RX ADMIN — FAMOTIDINE 20 MG: 10 INJECTION, SOLUTION INTRAVENOUS at 07:57

## 2025-02-28 RX ADMIN — DOBUTAMINE HYDROCHLORIDE 2.5 MCG/KG/MIN: 200 INJECTION INTRAVENOUS at 07:57

## 2025-02-28 RX ADMIN — SODIUM CHLORIDE, PRESERVATIVE FREE 10 ML: 5 INJECTION INTRAVENOUS at 08:01

## 2025-02-28 RX ADMIN — AMIODARONE HYDROCHLORIDE 0.5 MG/MIN: 50 INJECTION, SOLUTION INTRAVENOUS at 19:51

## 2025-02-28 ASSESSMENT — PAIN SCALES - GENERAL: PAINLEVEL_OUTOF10: 0

## 2025-02-28 NOTE — PROGRESS NOTES
[facility] CARDIOLOGY  Cardiac Electrophysiology Hospital Note    Initial Encounter/Consult Note    Patient Name: Jeb Pereira - :1950 - MRN:[id number]  Primary Cardiologist: Dr. Gomez [facility]   Consulting Cardiologist: Stuart Sena MD       Reason for encounter:   Cardiac Arrest    HPI:  74 y.o. male with severe aortic stenosis status post TAVR 25 at [facility].     Per wife, when she was getting up in the morning she heard a loud bang and found the patient on the floor.  He was still speaking and breathing at the time but shortly afterwards became unresponsive.  Per wife he was still breathing but progressively became worse and became blue.  EMS arrived and then performed CPR.  I do not have EMS strips available to review but reportedly he had some ventricular arrhythmia requiring defibrillation.   Reported torsades based on notes.  He was intubated in the field   Reportedly prolonged downtime with CPR around 1 hour or so and then 20 minutes in the emergency room before obtaining ROSC.  I personally called the ER physician who performed the code Dr. Mtz. Per Dr. Mtz, there were episodes of bradycardia during the code as well as ventricular arrhythmia     EKG on arrival showed sinus bradycardia with left bundle branch block.  He was transferred to ICU.  Telemetry showed multiple episodes of high-grade AV block with multiple dropped beats.      SUBJECTIVE:  He was intubated and sedated.  Pupils are equal and reactive to light.  He is on propofol sedation so unable to assess neurological status on admission but has been extubated and neuro function is normal now  He has his own sinus rate 86 bpm with mild IVCD, no AV block  He has 3-4 beat polymorphic NSVT on telemetry and non sustained atrial tachycardia now and then    His wife is at bedside  I called and spoke to his cardiologist Dr Gomez this am  Wife and he said he was

## 2025-02-28 NOTE — PROGRESS NOTES
Critical Care Progress Note  Angeline Cavanaugh MD          Date of Service:  2025  NAME:  Jeb Pereira  :  1950  MRN:  [id number]      Subjective/Hospital course:      History is limited, obtained from wife/ daughter at bedside and EMR review.   Patient had underwent TAVR at [facility] last week with prior normal LHC and was discharged home on DAPT that he has been compliant to. Wife reports no other medication and patient has been doing fine until this AM when she heard her  falling and found him on the ground. She called 911. Shortly before EMS arrived, patient became unresponsive, pulseless and cyanosed. EMS started CPR and continued for almost an hour in field with shockable rhythm and suspicion of torsades. Patient was shocked multiple times and brought in ER where CPR continued and total CPR time around 80-90 mins.   EKG in ER is showing bradycardia with wide complex, junctional rhythm. Patient was started on Epi and Levophed added.   CT head with no bleeding. ICU service was called for ICU admission.   On my initial evaluation in ER, patient was on 20 mcg of levophed and 4 mcg of Epi with amiodarone eat rate of 1 mg per minute.   Patient on no sedation, not responsive but breathing over the vent.   Stat echo and cardiology consult placed. Echo reported as reduced LV function and EF 18%, grade 3 DD, concentric LVH and increased LV mass, highly suspicious for infiltrative CMP.    On arrival in ICU, failed to wean epi and patient became hypotensive with intermittent episodes of bradycardia and Epi drip was started back.   Patient was evaluated by cardiology and in evening taken to cath lab for pacemaker insertion.       - more alert. Able to follow simple command. Epi at 3    - no acute events overnight. Started on dobutamine yesterday, EPI off.  Early this a.m had NSVT on 5 dobutamine, weaned down to 2.5   Discussed with Nephro who would like to proceed        Vital Signs:   Patient Vitals for the past 4 hrs:   BP Pulse Resp SpO2 Weight   02/28/25 1545 -- -- -- -- 75.1 kg (165 lb 9.1 oz)   02/28/25 1415 130/82 (!) 105 16 99 % --   02/28/25 1400 -- 99 22 100 % --   02/28/25 1345 -- (!) 106 18 99 % --   02/28/25 1330 (!) 123/90 (!) 106 14 100 % --   02/28/25 1315 130/77 (!) 102 18 99 % --   02/28/25 1300 116/82 96 14 90 % --   02/28/25 1245 115/74 (!) 111 12 100 % --        Intake/Output Summary (Last 24 hours) at 2/28/2025 1644  Last data filed at 2/28/2025 1400  Gross per 24 hour   Intake 661.91 ml   Output 221 ml   Net 440.91 ml        Physical Examination:    Physical Exam  Cardiovascular:      Rate and Rhythm: Normal rate and regular rhythm.      Heart sounds: Murmur heard.   Pulmonary:      Breath sounds: Rhonchi present.   Skin:     General: Skin is warm.   Neurological:      Comments: Mild confusion, occasionally repeating questions          Labs and Imaging:   Reviewed.      Medications:     Current Facility-Administered Medications   Medication Dose Route Frequency    glucose chewable tablet 16 g  4 tablet Oral PRN    dextrose bolus 10% 125 mL  125 mL IntraVENous PRN    Or    dextrose bolus 10% 250 mL  250 mL IntraVENous PRN    glucagon injection 1 mg  1 mg SubCUTAneous PRN    dextrose 10 % infusion   IntraVENous Continuous PRN    insulin lispro (HUMALOG,ADMELOG) injection vial 0-4 Units  0-4 Units SubCUTAneous 4 times per day    amiodarone (CORDARONE) 450 mg in dextrose 5 % 250 mL infusion ([id number])  1 mg/min IntraVENous Continuous    Followed by    amiodarone (CORDARONE) 450 mg in dextrose 5 % 250 mL infusion ([id number])  0.5 mg/min IntraVENous Continuous    heparin (porcine) injection 4,000 Units  4,000 Units IntraVENous PRN    heparin (porcine) injection 2,000 Units  2,000 Units IntraVENous PRN    heparin 25,000 units in dextrose 5% 250 mL (premix) infusion  5-30 Units/kg/hr IntraVENous Continuous    famotidine (PEPCID) 20 MG/2ML 20 mg in sodium chloride

## 2025-02-28 NOTE — PROGRESS NOTES
Cardiology Update:     Recent cardiac cath report on 1/29/25 obtained:     LM free of disease  LAD with 40% stenosis in mid vessel, no obstructive disease seen  Lcx with mild disease in the prox portion of OM branch of around 20-30%  RI disease free  RCA w/ 30-40% stenosis in the prox portion of vessel     Mild non obstructive CAD

## 2025-02-28 NOTE — PROCEDURES
PROCEDURE NOTE  Date: 2/28/2025   Name: Jeb Pereira  YOB: 1950    Procedures         [facility] CRITICAL [facility]      Procedure Note - Central Venous Access:   Performed by Angeline Cavanaugh MD    Obtained informed Consent.    Immediately prior to the procedure, the patient was reevaluated and found suitable for the planned procedure and any planned medications.    Immediately prior to the procedure a time out was called to verify the correct patient, procedure, equipment, staff, and marking as appropriate.    The site was prepped with ChloraPrep and Sterile draping.   Using Seldinger technique a Hemodialysis Catheter was placed in the Left, Internal Jugular Vein via direct cannulation with 1 number of attempts for Dialysis.  Ultrasound Guidance was utilized. Verbally/Visually confirmed wire removal with RN. Wire in IJ confirmation with US prior to dilation. There was good blood return.  The following complications were encountered: None.  A follow-up chest x-ray was ordered post procedure.  The procedure was tolerated well.      Angeline Cavanaugh MD [facility] Critical Care  [phone]

## 2025-02-28 NOTE — PROGRESS NOTES
Cardiology Update:     Notified by intensivist pt went into a-fib w/ RVR, started on IV amio. EF has improved to 50%, will d/c dobutamine. Start heparin gtt for AC, starting HD so will avoid lovenox. Hold off on starting DOAC until all procedures completed.     Cont ASA/plavix for now, likely d/c ASA on discharge since would be starting DOAC.

## 2025-02-28 NOTE — PROGRESS NOTES
[facility] CARDIOLOGY  Cardiology Care Note                  []Initial visit     [x]Established visit     Patient Name: Jeb Pereira - :1950 - MRN:[id number]  Primary Cardiologist: None DR. Gomez S  Consulting Cardiologist: Anish Manuel MD     Reason for initial visit: cardiac arrest     HPI:    Patient is a 74-year-old male who is status post TAVR approximately 1 week ago at [facility] (Dr. Long).  Per family, patient has been doing well until this morning when daughter states that he had a fall and she had him fall.  Initially when she went to his side, he was able to talk but then passed out.  By the time 911 had been called and CPR/drugs were initiated.  ROSC was obtained.  Patient came to the ED.  Patient had to be resuscitated again in the ED-VT/V-fib also noted.  ROSC obtained.  Prolonged downtime (approximately 60 to 90 minutes).     As per Care Everywhere notes-patient had TAVR and had a trans venous temporary pacemaker for a day.  Per family-patient had heart catheterization prior to TAVR and was told that he did not have any significant blockages.  There is also mention of left bundle branch block in prior notes.    Now intubated/sedated with propofol.  Requiring epi gtt 2 mcg, Levophed gtt and Amio gtt 0.5 mg/min.  Per ER reports, pt had VT again while in ER.    Down time is approximated around 1 hr-1.5 hrs.    Head CT non-contrast, negative for acute process.  Pt has extensive bruising to left and back side of head.      In ER,  pt noted to have CHF with AV dissociation.      Pt is s/p TAVR 1 week at [facility] by Dr. Long/Dr. Gomez.  Per care everywhere, it was noted pt did develop LBBB intraprocedure and TVP was placed.   Per notes, pt did not require pacing and wire was removed POD1.       Wife reports LHC 2 weeks prior to TAVr, with reports of NO CAD.         25 - Extubated    C/o Nausea

## 2025-02-28 NOTE — PROGRESS NOTES
Nutrition Note    Placed diet order per IDR discussion. 1500 mL FR per intensivist.     Lashell Mancilla MS, RD, [facility]  Ext: [phone], or via [employer]

## 2025-02-28 NOTE — PROGRESS NOTES
1024 Pt had coughing fit. HR increased to 140s. Sustained 120s. EKG preformed. Confirmed pt new onset afib RVR. MD tee aware. Verbal order to stop dobutamine. Pt not shwoing any signs of discomfort. Denies SOB, chest pain. /80 . Cardiology also notified. No new orders at this time.  1058 Amio bolus and additional drip ordered  1230 Consent form completed by patient wife.  1247 Time out completed for verification of correct patient, procedure, placement, allergies for central HD time placement by Dr. Tee.  1730 Pt converted back into normal sinus  1900 Report given to oncoming night shift nurse

## 2025-02-28 NOTE — PROGRESS NOTES
0010 Discussed low urine output with MARKO Smith, see MAR for Dobutamine changes. Flushed chinchilla to ensure patency. RT at bedside to apply Bipap.    0130 While in room with patient, he complained that he felt nauseous, bipap removed, MARKO Smith notified.     0150 Informed MARKO Smith that pt is exhibiting increased ectopy, Dobutamine reduced. Advised to notify if MAPs fall below 75.     0645 MD Cavanaugh informed of 4 beat run PVCs, Dobutamine decreased, see MAR.

## 2025-02-28 NOTE — CARE COORDINATION
2/28/25  2:35 PM    Care Management Progress Note    Reason for Admission:   Cardiac arrest (HCC) [I46.9]  Procedure(s) (LRB):  Insert temporary pacemaker (N/A)  Ultrasound guided vascular access (N/A)  2 Days Post-Op    Patient Admission Status: Inpatient  RUR: 13%  Hospitalization in the last 30 days (Readmission):  No        Transition of care plan:  Ongoing medical management:  Electrophysiology, cardiology, nephrology consulted  Discharge plan: Continuing to follow for needs- will need PT/OT evals to determine needs.   Discharge plan communicated with patient and/or discharge caregiver: Yes    Date 1st IMM letter given: 2/28/25  Outpatient follow-up.  Transport at discharge: DOMO Lockwood MSW  [facility]      Available via [employer]

## 2025-02-28 NOTE — PROGRESS NOTES
[facility]    Jeb Pereira  YOB: 1950          Assessment & Plan:     Oliguric ABRAHAM presumed secondary to ATN  S/p Bradycardic arrest  Lactic acidosis  Resp failure  S/p TAVR 2/18    Rec:  Remains oliguric despite restoration of BP  May have prolonged ABRAHAM  I have recommended dialysis to pt and family. Risks d/w them with emphasis on bleeding, infection, hypotension. Pt consents for dialysis.  Place temp line today and HD today x 2 hour then tomorrow x 3 hours       Subjective:   CC: ABRAHAM  HPI: Remains oliguric. Cr up to 4.6. Denies sob/n/v. Extubated and alert.  Current Facility-Administered Medications   Medication Dose Route Frequency    glucose chewable tablet 16 g  4 tablet Oral PRN    dextrose bolus 10% 125 mL  125 mL IntraVENous PRN    Or    dextrose bolus 10% 250 mL  250 mL IntraVENous PRN    glucagon injection 1 mg  1 mg SubCUTAneous PRN    dextrose 10 % infusion   IntraVENous Continuous PRN    insulin lispro (HUMALOG,ADMELOG) injection vial 0-4 Units  0-4 Units SubCUTAneous 4 times per day    DOBUTamine (DOBUTREX) 500 mg in dextrose 5 % 250 mL infusion  2.5-10 mcg/kg/min IntraVENous Continuous    famotidine (PEPCID) 20 MG/2ML 20 mg in sodium chloride (PF) 0.9 % 10 mL injection  20 mg IntraVENous Daily    lidocaine 4 % external patch 1 patch  1 patch TransDERmal Daily    aspirin chewable tablet 81 mg  81 mg Oral Daily    clopidogrel (PLAVIX) tablet 75 mg  75 mg Oral Daily    lactulose (CHRONULAC) 10 GM/15ML solution 10 g  10 g Oral TID    sodium chloride flush 0.9 % injection 5-40 mL  5-40 mL IntraVENous 2 times per day    sodium chloride flush 0.9 % injection 5-40 mL  5-40 mL IntraVENous PRN    0.9 % sodium chloride infusion   IntraVENous PRN    potassium chloride 20 mEq/50 mL IVPB (Central Line)  20 mEq IntraVENous PRN    Or    potassium chloride 10 mEq/100 mL IVPB (Peripheral Line)  10 mEq IntraVENous PRN    magnesium sulfate 2000 mg

## 2025-03-01 ENCOUNTER — APPOINTMENT (OUTPATIENT)
Dept: VASCULAR SURGERY | Facility: HOSPITAL | Age: 75
End: 2025-03-01
Attending: HOSPITALIST
Payer: MEDICARE

## 2025-03-01 LAB
ALBUMIN SERPL-MCNC: 3 G/DL (ref 3.5–5)
ALBUMIN/GLOB SERPL: 1.2 (ref 1.1–2.2)
ALP SERPL-CCNC: 130 U/L (ref 45–117)
ALT SERPL-CCNC: 1151 U/L (ref 12–78)
ANA SER QL: NEGATIVE
ANION GAP SERPL CALC-SCNC: 6 MMOL/L (ref 2–12)
AST SERPL-CCNC: 973 U/L (ref 15–37)
BILIRUB SERPL-MCNC: 1.1 MG/DL (ref 0.2–1)
BUN SERPL-MCNC: 54 MG/DL (ref 6–20)
BUN/CREAT SERPL: 13 (ref 12–20)
C3 SERPL-MCNC: 50 MG/DL (ref 82–167)
C4 SERPL-MCNC: 12 MG/DL (ref 12–38)
CALCIUM SERPL-MCNC: 8.5 MG/DL (ref 8.5–10.1)
CHLORIDE SERPL-SCNC: 104 MMOL/L (ref 97–108)
CO2 SERPL-SCNC: 25 MMOL/L (ref 21–32)
CREAT SERPL-MCNC: 4.19 MG/DL (ref 0.7–1.3)
ERYTHROCYTE [DISTWIDTH] IN BLOOD BY AUTOMATED COUNT: 11.9 % (ref 11.5–14.5)
GLOBULIN SER CALC-MCNC: 2.6 G/DL (ref 2–4)
GLUCOSE BLD STRIP.AUTO-MCNC: 123 MG/DL (ref 65–117)
GLUCOSE BLD STRIP.AUTO-MCNC: 124 MG/DL (ref 65–117)
GLUCOSE BLD STRIP.AUTO-MCNC: 73 MG/DL (ref 65–117)
GLUCOSE SERPL-MCNC: 139 MG/DL (ref 65–100)
HBV SURFACE AB SER QL: NONREACTIVE
HBV SURFACE AB SER-ACNC: 8.17 MIU/ML
HBV SURFACE AG SER QL: <0.1 INDEX
HBV SURFACE AG SER QL: NEGATIVE
HCT VFR BLD AUTO: 30.9 % (ref 36.6–50.3)
HGB BLD-MCNC: 10.5 G/DL (ref 12.1–17)
INR PPP: 1.2 (ref 0.9–1.1)
MAGNESIUM SERPL-MCNC: 2.7 MG/DL (ref 1.6–2.4)
MCH RBC QN AUTO: 32.8 PG (ref 26–34)
MCHC RBC AUTO-ENTMCNC: 34 G/DL (ref 30–36.5)
MCV RBC AUTO: 96.6 FL (ref 80–99)
NRBC # BLD: 0 K/UL (ref 0–0.01)
NRBC BLD-RTO: 0 PER 100 WBC
PHOSPHATE SERPL-MCNC: 5.2 MG/DL (ref 2.6–4.7)
PLATELET # BLD AUTO: 130 K/UL (ref 150–400)
PMV BLD AUTO: 10.9 FL (ref 8.9–12.9)
POTASSIUM SERPL-SCNC: 4.5 MMOL/L (ref 3.5–5.1)
PROT SERPL-MCNC: 5.6 G/DL (ref 6.4–8.2)
PROTHROMBIN TIME: 12.5 SEC (ref 9.2–11.2)
RBC # BLD AUTO: 3.2 M/UL (ref 4.1–5.7)
SERVICE CMNT-IMP: ABNORMAL
SERVICE CMNT-IMP: ABNORMAL
SERVICE CMNT-IMP: NORMAL
SODIUM SERPL-SCNC: 135 MMOL/L (ref 136–145)
UFH PPP CHRO-ACNC: 0.2 IU/ML
UFH PPP CHRO-ACNC: 0.21 IU/ML
UFH PPP CHRO-ACNC: 0.24 IU/ML
UFH PPP CHRO-ACNC: <0.1 IU/ML
WBC # BLD AUTO: 9.8 K/UL (ref 4.1–11.1)

## 2025-03-01 PROCEDURE — 2580000003 HC RX 258: Performed by: INTERNAL MEDICINE

## 2025-03-01 PROCEDURE — 85610 PROTHROMBIN TIME: CPT

## 2025-03-01 PROCEDURE — 84100 ASSAY OF PHOSPHORUS: CPT

## 2025-03-01 PROCEDURE — 90935 HEMODIALYSIS ONE EVALUATION: CPT

## 2025-03-01 PROCEDURE — 6360000002 HC RX W HCPCS: Performed by: INTERNAL MEDICINE

## 2025-03-01 PROCEDURE — 2000000000 HC ICU R&B

## 2025-03-01 PROCEDURE — 85027 COMPLETE CBC AUTOMATED: CPT

## 2025-03-01 PROCEDURE — 97162 PT EVAL MOD COMPLEX 30 MIN: CPT

## 2025-03-01 PROCEDURE — 2500000003 HC RX 250 WO HCPCS: Performed by: INTERNAL MEDICINE

## 2025-03-01 PROCEDURE — 82962 GLUCOSE BLOOD TEST: CPT

## 2025-03-01 PROCEDURE — 6360000002 HC RX W HCPCS: Performed by: NURSE PRACTITIONER

## 2025-03-01 PROCEDURE — 36415 COLL VENOUS BLD VENIPUNCTURE: CPT

## 2025-03-01 PROCEDURE — 94761 N-INVAS EAR/PLS OXIMETRY MLT: CPT

## 2025-03-01 PROCEDURE — 2500000003 HC RX 250 WO HCPCS: Performed by: HOSPITALIST

## 2025-03-01 PROCEDURE — 83735 ASSAY OF MAGNESIUM: CPT

## 2025-03-01 PROCEDURE — 99232 SBSQ HOSP IP/OBS MODERATE 35: CPT | Performed by: SPECIALIST

## 2025-03-01 PROCEDURE — 6370000000 HC RX 637 (ALT 250 FOR IP): Performed by: INTERNAL MEDICINE

## 2025-03-01 PROCEDURE — 93926 LOWER EXTREMITY STUDY: CPT

## 2025-03-01 PROCEDURE — 5A1D70Z PERFORMANCE OF URINARY FILTRATION, INTERMITTENT, LESS THAN 6 HOURS PER DAY: ICD-10-PCS | Performed by: INTERNAL MEDICINE

## 2025-03-01 PROCEDURE — 85520 HEPARIN ASSAY: CPT

## 2025-03-01 PROCEDURE — 80053 COMPREHEN METABOLIC PANEL: CPT

## 2025-03-01 PROCEDURE — 97530 THERAPEUTIC ACTIVITIES: CPT

## 2025-03-01 RX ORDER — FAMOTIDINE 20 MG/1
20 TABLET, FILM COATED ORAL DAILY
Status: DISCONTINUED | OUTPATIENT
Start: 2025-03-02 | End: 2025-03-07

## 2025-03-01 RX ADMIN — FAMOTIDINE 20 MG: 10 INJECTION, SOLUTION INTRAVENOUS at 09:03

## 2025-03-01 RX ADMIN — LACTULOSE 10 G: 10 SOLUTION ORAL at 09:03

## 2025-03-01 RX ADMIN — CLOPIDOGREL BISULFATE 75 MG: 75 TABLET ORAL at 09:04

## 2025-03-01 RX ADMIN — LACTULOSE 10 G: 10 SOLUTION ORAL at 13:29

## 2025-03-01 RX ADMIN — HEPARIN SODIUM 2000 UNITS: 1000 INJECTION INTRAVENOUS; SUBCUTANEOUS at 07:07

## 2025-03-01 RX ADMIN — HEPARIN SODIUM 2000 UNITS: 1000 INJECTION INTRAVENOUS; SUBCUTANEOUS at 14:01

## 2025-03-01 RX ADMIN — HEPARIN SODIUM 18 UNITS/KG/HR: 10000 INJECTION, SOLUTION INTRAVENOUS at 13:28

## 2025-03-01 RX ADMIN — HEPARIN SODIUM 4000 UNITS: 1000 INJECTION, SOLUTION INTRAVENOUS; SUBCUTANEOUS at 00:37

## 2025-03-01 RX ADMIN — SODIUM CHLORIDE, PRESERVATIVE FREE 10 ML: 5 INJECTION INTRAVENOUS at 20:04

## 2025-03-01 RX ADMIN — ASPIRIN 81 MG: 81 TABLET, CHEWABLE ORAL at 09:04

## 2025-03-01 RX ADMIN — SODIUM CHLORIDE, PRESERVATIVE FREE 10 ML: 5 INJECTION INTRAVENOUS at 09:04

## 2025-03-01 RX ADMIN — HEPARIN SODIUM 2000 UNITS: 1000 INJECTION INTRAVENOUS; SUBCUTANEOUS at 21:23

## 2025-03-01 RX ADMIN — AMIODARONE HYDROCHLORIDE 0.5 MG/MIN: 50 INJECTION, SOLUTION INTRAVENOUS at 10:42

## 2025-03-01 ASSESSMENT — PAIN SCALES - GENERAL
PAINLEVEL_OUTOF10: 0

## 2025-03-01 NOTE — PROGRESS NOTES
1200- Bedside and Verbal shift change report given to Raúl HERNANDEZ (oncoming nurse) by Marely HERNANDEZ (offgoing nurse). Report included the following information Nurse Handoff Report, Intake/Output, MAR, Cardiac Rhythm SR, BBB, and Neuro Assessment.    Assessment complete, see flowsheets.    1400- Abdirizakita at bedside. Report given.    1530- Assessment complete, see flowsheets    1800- Report received from Myra RN, 1L removed.     Bedside and Verbal shift change report given to Caitlin HERNANDEZ (oncoming nurse) by Raúl HERNANDEZ (offgoing nurse). Report included the following information Nurse Handoff Report, Intake/Output, MAR, Cardiac Rhythm SR, LBBB, and Neuro Assessment.

## 2025-03-01 NOTE — FLOWSHEET NOTE
PRE TREATMENT   03/01/25 1415   Observations & Evaluations   Level of Consciousness 0   Oriented X some confusion at times   Heart Rhythm Irregular   Respiratory Quality/Effort Unlabored   O2 Device Nasal cannula   Bilateral Breath Sounds Clear   Skin Color Pale   Skin Condition/Temp Cool;Dry   Abdomen Inspection Flat;Soft   Edema None   Vital Signs   BP (!) 132/92   Temp 98.2 °F (36.8 °C)   Pulse 68   Respirations 17   SpO2 99 %   Pain Assessment   Pain Assessment None - Denies Pain   Pain Level 0   Hemodialysis Central Access - Temporary Left Neck   Placement Date/Time: 02/28/25 1314   Present on Admission/Arrival: No  Inserted by: MD Cavanaugh  Insertion Practices: Chlorohexadine skin antisepsis;Hand hygiene;Maximal barrier precautions;Optimal catheter site selection;Sterile ultrasound technique...   Continued need for line? Yes   Site Assessment Clean, dry & intact   CVC Lumen Status Flushed;Brisk blood return   Blue Lumen Status Flushed;Brisk blood return   Red Lumen Status Flushed;Brisk blood return   Line Care Connections checked and tightened;Chlorhexidine wipes;Line pulled back;Ports disinfected   Dressing Type Antimicrobial   Date of Last Dressing Change 02/28/25   Dressing Status Clean, dry & intact        03/01/25 1429   Technical Checks   Dialysis Machine No. [id number]  (01)   RO Machine Number [id number]  (R01)   Dialyzer Lot No. [id number]   Tubing Lot Number [id number]   All Connections Secure Yes   NS Bag Yes   Saline Line Double Clamped Yes   Dialyzer Nipro ELISIO   Prime Volume (mL) 200 mL   ICEBOAT I;C;E;B;O;A;T   RO Machine Log Sheet Completed Yes   Machine Alarm Self Test Completed;Passed   Air Foam Detector Tested;Proper Function;pH Reading   Extracorporeal Circuit Tested for Integrity Yes   Machine Conductivity 13.7   Manual Ph 7.4   Bleach Test (Neg) Yes   Bath Temperature 98.6 °F (37 °C)   Dialysis Bath   K+ (Potassium) 2   Ca+ (Calcium) 2.5   Na+ (Sodium) 138   HCO3 (Bicarb) 35   Bicarbonate

## 2025-03-01 NOTE — PROGRESS NOTES
PT has been vomiting per pt and RN.  Will hold BIPAP until this clears.  Will continue to monitor.

## 2025-03-01 NOTE — PROGRESS NOTES
[facility] CARDIOLOGY  Cardiology Care Note                  []Initial visit     [x]Established visit     Patient Name: Jeb Pereira - :1950 - MRN:[id number]  Primary Cardiologist: None DR. Gomez S  Consulting Cardiologist: Anish Manuel MD     Reason for initial visit: cardiac arrest     HPI:    Patient is a 74-year-old male who is status post TAVR approximately 1 week ago at [facility] (Dr. Long).  Per family, patient has been doing well until this morning when daughter states that he had a fall.  Initially when she went to his side, he was able to talk but then passed out.  By the time 911 had been called and CPR/drugs were initiated.  ROSC was obtained.  Patient came to the ED.  Patient had to be resuscitated again in the ED-VT/V-fib also noted.  ROSC obtained.  Prolonged downtime (approximately 60 to 90 minutes).     As per Care Everywhere notes-patient had TAVR and had a trans venous temporary pacemaker for a day.  Per family-patient had heart catheterization prior to TAVR and was told that he did not have any significant blockages.  There is also mention of left bundle branch block in prior notes.    Now intubated/sedated with propofol.  Requiring epi gtt 2 mcg, Levophed gtt and Amio gtt 0.5 mg/min.  Per ER reports, pt had VT again while in ER.    Down time is approximated around 1 hr-1.5 hrs.    Head CT non-contrast, negative for acute process.  Pt has extensive bruising to left and back side of head.      In ER,  pt noted to have CHF with AV dissociation.      Pt is s/p TAVR 1 week at [facility] by Dr. Long/Dr. Gomez.  Per care everywhere, it was noted pt did develop LBBB intraprocedure and TVP was placed.   Per notes, pt did not require pacing and wire was removed POD1.       Wife reports LHC 2 weeks prior to TAVr, with reports of NO CAD.         25 - Extubated    Subjective:    Feeling better - denies CP

## 2025-03-01 NOTE — PLAN OF CARE
Problem: Physical Therapy - Adult  Goal: By Discharge: Performs mobility at highest level of function for planned discharge setting.  See evaluation for individualized goals.  Description: FUNCTIONAL STATUS PRIOR TO ADMISSION: Patient was independent and active without use of DME.    HOME SUPPORT PRIOR TO ADMISSION: The patient lived with his very supportive wife but did not require assistance.    Physical Therapy Goals  Initiated 3/1/2025  1.  Patient will move from supine to sit and sit to supine in bed with supervision/set-up within 7 day(s).    2.  Patient will perform sit to stand with supervision/set-up within 7 day(s).  3.  Patient will transfer from bed to chair and chair to bed with supervision/set-up using the least restrictive device within 7 day(s).  4.  Patient will ambulate with supervision/set-up for 150 feet with the least restrictive device within 7 day(s).   5:  Patient will state PPM precautions when prompted and follow during functional activity within 7 days.     Outcome: Progressing   PHYSICAL THERAPY EVALUATION    Patient: Jeb Pereira (74 y.o. male)  Date: 3/1/2025  Primary Diagnosis: Cardiac arrest (HCC) [I46.9]  Procedure(s) (LRB):  Insert temporary pacemaker (N/A)  Ultrasound guided vascular access (N/A) 3 Days Post-Op   Precautions: Restrictions/Precautions  Restrictions/Precautions: Fall Risk (pacemaker)            ASSESSMENT :   DEFICITS/IMPAIRMENTS:   The patient is limited by decreased functional mobility, body mechanics, activity tolerance, safety awareness, cognition, attention/concentration, balance, and decreased insight into deficits      Based on the impairments listed above, the patient presents far below his independent baseline following admission for cardiac arrest in the field requiring a temporary pacemaker. He had a TAVR earlier in the week and developed cardiac arrest in the field requiring (per chart) CPR for 60-80 minutes. Yesterday, he developed afib with RVR

## 2025-03-01 NOTE — PROGRESS NOTES
Critical Care Progress Note  García Christian MD          Date of Service:  3/1/2025  NAME:  Jeb Pereira  :  1950  MRN:  [id number]      Subjective/Hospital course:      History is limited, obtained from wife/ daughter at bedside and EMR review.   Patient had underwent TAVR at [facility] last week with prior normal LHC and was discharged home on DAPT that he has been compliant to. Wife reports no other medication and patient has been doing fine until this AM when she heard her  falling and found him on the ground. She called 911. Shortly before EMS arrived, patient became unresponsive, pulseless and cyanosed. EMS started CPR and continued for almost an hour in field with shockable rhythm and suspicion of torsades. Patient was shocked multiple times and brought in ER where CPR continued and total CPR time around 80-90 mins.   EKG in ER is showing bradycardia with wide complex, junctional rhythm. Patient was started on Epi and Levophed added.   CT head with no bleeding. ICU service was called for ICU admission.   On my initial evaluation in ER, patient was on 20 mcg of levophed and 4 mcg of Epi with amiodarone eat rate of 1 mg per minute.   Patient on no sedation, not responsive but breathing over the vent.   Stat echo and cardiology consult placed. Echo reported as reduced LV function and EF 18%, grade 3 DD, concentric LVH and increased LV mass, highly suspicious for infiltrative CMP.    On arrival in ICU, failed to wean epi and patient became hypotensive with intermittent episodes of bradycardia and Epi drip was started back.   Patient was evaluated by cardiology and in evening taken to cath lab for pacemaker insertion.       - more alert. Able to follow simple command. Epi at 3    - no acute events overnight. Started on dobutamine yesterday, EPI off.  Early this a.m had NSVT on 5 dobutamine, weaned down to 2.5   Discussed with Nephro who would like to proceed  acetaminophen (TYLENOL) suppository 650 mg  650 mg Rectal Q6H PRN     ______________________________________________________________________  EXPECTED LENGTH OF STAY: 7  ACTUAL LENGTH OF STAY:          3                 García Christian MD   [facility]

## 2025-03-01 NOTE — FLOWSHEET NOTE
Each catheter limb disinfected per p&p, caps removed, hubs disinfected per p&p.  Primary RN SBAR: Srini Pérez RN  Incapacitated Nurse [facility]. provided: YES  Patient Education provided: TX TIME, GOAL, CHANGES,   Preferred Education method and Primary language: ENGLISH, VERBAL  Hospital General Consent Verified: YES  Hospital associated wait time; reason: NONE     Hepatitis B Surface Ag   Date/Time Value Ref Range Status   02/28/2025 01:03 PM <0.10 Index Final     Hep B S Ag Interp   Date/Time Value Ref Range Status   02/28/2025 01:03 PM Negative NEG   Final     Hep B S Ab   Date/Time Value Ref Range Status   02/28/2025 01:03 PM 8.17 mIU/mL Final     Hep B S Ab Interp   Date/Time Value Ref Range Status   02/28/2025 01:03 PM NONREACTIVE NR   Final     Comment:     (NOTE)  The [employer] Centaur Anti-HBs2 assay is traceable to the World Health   Organization (WHO) Hepatitis B Immunoglobulin 1st International   Reference Preparation (1977). Samples with a calculated value of 10   mIU/mL or greater are considered reactive (protective) in accordance   with the CDC guidelines. The accepted criteria for immunity to HBV is   anti-HBs activity greater than or equal to 10 mIU/mL, as defined by   the WHO International Reference Preparation.  Assay performance has not been established in pregnant women,   patients who are immunosuppressed or immunocompromised, nor have   performance characteristics been established in conjunction with   other 's assays for specific HBV serologic markers. This   assay does not differentiate between vaccine induced immune response   and a response due to infection with HBV. Passively acquired anti-HBs   may be identified following patient transfusion, receipt of   immunoglobulin products, etc.        02/28/25 2200   Treatment   Treatment Goal 1000   Observations & Evaluations   Level of Consciousness 0   Oriented X 4   Heart Rhythm Regular   Respiratory Quality/Effort Unlabored   O2 Device

## 2025-03-02 LAB
ALBUMIN SERPL-MCNC: 2.9 G/DL (ref 3.5–5)
ALBUMIN/GLOB SERPL: 1 (ref 1.1–2.2)
ALP SERPL-CCNC: 136 U/L (ref 45–117)
ALT SERPL-CCNC: 1330 U/L (ref 12–78)
ANION GAP SERPL CALC-SCNC: 8 MMOL/L (ref 2–12)
AST SERPL-CCNC: 950 U/L (ref 15–37)
BILIRUB SERPL-MCNC: 0.9 MG/DL (ref 0.2–1)
BUN SERPL-MCNC: 42 MG/DL (ref 6–20)
BUN/CREAT SERPL: 12 (ref 12–20)
CALCIUM SERPL-MCNC: 8.2 MG/DL (ref 8.5–10.1)
CHLORIDE SERPL-SCNC: 98 MMOL/L (ref 97–108)
CO2 SERPL-SCNC: 27 MMOL/L (ref 21–32)
CREAT SERPL-MCNC: 3.43 MG/DL (ref 0.7–1.3)
ECHO BSA: 2.06 M2
ERYTHROCYTE [DISTWIDTH] IN BLOOD BY AUTOMATED COUNT: 11.9 % (ref 11.5–14.5)
GLOBULIN SER CALC-MCNC: 2.8 G/DL (ref 2–4)
GLUCOSE BLD STRIP.AUTO-MCNC: 116 MG/DL (ref 65–117)
GLUCOSE SERPL-MCNC: 141 MG/DL (ref 65–100)
HCT VFR BLD AUTO: 32.1 % (ref 36.6–50.3)
HGB BLD-MCNC: 10.7 G/DL (ref 12.1–17)
INR PPP: 1.2 (ref 0.9–1.1)
MAGNESIUM SERPL-MCNC: 2.3 MG/DL (ref 1.6–2.4)
MCH RBC QN AUTO: 31.9 PG (ref 26–34)
MCHC RBC AUTO-ENTMCNC: 33.3 G/DL (ref 30–36.5)
MCV RBC AUTO: 95.8 FL (ref 80–99)
NRBC # BLD: 0 K/UL (ref 0–0.01)
NRBC BLD-RTO: 0 PER 100 WBC
PHOSPHATE SERPL-MCNC: 4 MG/DL (ref 2.6–4.7)
PLATELET # BLD AUTO: 126 K/UL (ref 150–400)
PMV BLD AUTO: 11.2 FL (ref 8.9–12.9)
POTASSIUM SERPL-SCNC: 4 MMOL/L (ref 3.5–5.1)
PROT SERPL-MCNC: 5.7 G/DL (ref 6.4–8.2)
PROTHROMBIN TIME: 13 SEC (ref 9.2–11.2)
RBC # BLD AUTO: 3.35 M/UL (ref 4.1–5.7)
SERVICE CMNT-IMP: NORMAL
SODIUM SERPL-SCNC: 133 MMOL/L (ref 136–145)
UFH PPP CHRO-ACNC: 0.25 IU/ML
UFH PPP CHRO-ACNC: 0.6 IU/ML
UFH PPP CHRO-ACNC: 1.47 IU/ML
VAS RIGHT ATA DIST PSV: 53.4 CM/S
VAS RIGHT CFA PROX PSV: 58.5 CM/S
VAS RIGHT POP A DIST PSV: 57.4 CM/S
VAS RIGHT POP A PROX PSV: 45.3 CM/S
VAS RIGHT POP A PROX VEL RATIO: 0.73
VAS RIGHT SFA DIST PSV: 61.8 CM/S
VAS RIGHT SFA DIST VEL RATIO: 1.3
VAS RIGHT SFA MID PSV: 47.5 CM/S
VAS RIGHT SFA MID VEL RATIO: 1
VAS RIGHT SFA PROX PSV: 45.3 CM/S
VAS RIGHT SFA PROX VEL RATIO: 0.8
WBC # BLD AUTO: 8.6 K/UL (ref 4.1–11.1)

## 2025-03-02 PROCEDURE — 2000000000 HC ICU R&B

## 2025-03-02 PROCEDURE — 84100 ASSAY OF PHOSPHORUS: CPT

## 2025-03-02 PROCEDURE — 2580000003 HC RX 258: Performed by: INTERNAL MEDICINE

## 2025-03-02 PROCEDURE — 85610 PROTHROMBIN TIME: CPT

## 2025-03-02 PROCEDURE — 85027 COMPLETE CBC AUTOMATED: CPT

## 2025-03-02 PROCEDURE — 36415 COLL VENOUS BLD VENIPUNCTURE: CPT

## 2025-03-02 PROCEDURE — 6370000000 HC RX 637 (ALT 250 FOR IP): Performed by: HOSPITALIST

## 2025-03-02 PROCEDURE — 97535 SELF CARE MNGMENT TRAINING: CPT

## 2025-03-02 PROCEDURE — 99232 SBSQ HOSP IP/OBS MODERATE 35: CPT | Performed by: SPECIALIST

## 2025-03-02 PROCEDURE — 6370000000 HC RX 637 (ALT 250 FOR IP): Performed by: INTERNAL MEDICINE

## 2025-03-02 PROCEDURE — 80053 COMPREHEN METABOLIC PANEL: CPT

## 2025-03-02 PROCEDURE — 83735 ASSAY OF MAGNESIUM: CPT

## 2025-03-02 PROCEDURE — 2700000000 HC OXYGEN THERAPY PER DAY

## 2025-03-02 PROCEDURE — 2500000003 HC RX 250 WO HCPCS: Performed by: HOSPITALIST

## 2025-03-02 PROCEDURE — 6360000002 HC RX W HCPCS: Performed by: NURSE PRACTITIONER

## 2025-03-02 PROCEDURE — 85520 HEPARIN ASSAY: CPT

## 2025-03-02 PROCEDURE — 97165 OT EVAL LOW COMPLEX 30 MIN: CPT

## 2025-03-02 PROCEDURE — 94761 N-INVAS EAR/PLS OXIMETRY MLT: CPT

## 2025-03-02 PROCEDURE — 97530 THERAPEUTIC ACTIVITIES: CPT

## 2025-03-02 PROCEDURE — 6360000002 HC RX W HCPCS: Performed by: INTERNAL MEDICINE

## 2025-03-02 PROCEDURE — 82962 GLUCOSE BLOOD TEST: CPT

## 2025-03-02 RX ADMIN — CLOPIDOGREL BISULFATE 75 MG: 75 TABLET ORAL at 07:53

## 2025-03-02 RX ADMIN — SODIUM CHLORIDE, PRESERVATIVE FREE 10 ML: 5 INJECTION INTRAVENOUS at 20:43

## 2025-03-02 RX ADMIN — HEPARIN SODIUM 2000 UNITS: 1000 INJECTION INTRAVENOUS; SUBCUTANEOUS at 20:41

## 2025-03-02 RX ADMIN — FAMOTIDINE 20 MG: 20 TABLET, FILM COATED ORAL at 07:53

## 2025-03-02 RX ADMIN — AMIODARONE HYDROCHLORIDE 0.5 MG/MIN: 50 INJECTION, SOLUTION INTRAVENOUS at 18:20

## 2025-03-02 RX ADMIN — HEPARIN SODIUM 21 UNITS/KG/HR: 10000 INJECTION, SOLUTION INTRAVENOUS at 20:43

## 2025-03-02 RX ADMIN — AMIODARONE HYDROCHLORIDE 0.5 MG/MIN: 50 INJECTION, SOLUTION INTRAVENOUS at 03:14

## 2025-03-02 RX ADMIN — HEPARIN SODIUM 22 UNITS/KG/HR: 10000 INJECTION, SOLUTION INTRAVENOUS at 04:29

## 2025-03-02 RX ADMIN — SODIUM CHLORIDE, PRESERVATIVE FREE 10 ML: 5 INJECTION INTRAVENOUS at 07:51

## 2025-03-02 RX ADMIN — ASPIRIN 81 MG: 81 TABLET, CHEWABLE ORAL at 07:53

## 2025-03-02 ASSESSMENT — PAIN SCALES - GENERAL
PAINLEVEL_OUTOF10: 0
PAINLEVEL_OUTOF10: 0

## 2025-03-02 NOTE — PLAN OF CARE
Problem: Occupational Therapy - Adult  Goal: By Discharge: Performs self-care activities at highest level of function for planned discharge setting.  See evaluation for individualized goals.  Description: FUNCTIONAL STATUS PRIOR TO ADMISSION:  The patient was independent and active at baseline.  HOME SUPPORT: Patient lived with his wife but didn't require assistance.    Occupational Therapy Goals:  Initiated 3/2/2025  1.  Patient will perform grooming with Supervision/set-up sitting EOB within 7 day(s).  2.  Patient will perform lower body dressing with Set-up and Supervision within 7 day(s).  3.  Patient will perform toilet transfers with Supervision  within 7 day(s).  4.  Patient will perform all aspects of toileting with Supervision within 7 day(s).  5.  Patient will participate in upper extremity therapeutic exercise/activities with [place] for 5 minutes within 7 day(s).    6.  Patient will utilize energy conservation techniques during functional activities with verbal cues within 7 day(s).   7.  Patient will state PPM precautions when prompted and follow during functional activity within 7 days.   Outcome: Progressing  OCCUPATIONAL THERAPY EVALUATION    Patient: Jeb Pereira (74 y.o. male)  Date: 3/2/2025  Primary Diagnosis: Cardiac arrest (HCC) [I46.9]  Procedure(s) (LRB):  Insert temporary pacemaker (N/A)  Ultrasound guided vascular access (N/A) 4 Days Post-Op     Precautions: Fall Risk (pacemaker)                  ASSESSMENT :  The patient is limited from independent and active baseline by decreased activity tolerance and strength, impaired functional mobility and balance, SOB, decreased attention/concentration, and pacemaker precautions following admission for cardiac arrest (60-80  minutes of CPR) and temporary pacemaker placement. He was received semisupine in bed on RA, wife at bedside. Pt did not recall pacemaker precautions from previous PT session, reviewed all with pt and wife. He reported  13, 895-50                                                                                                                                                                                                                                 Pain Rating:  Pt reports some residual pain from CPR    Activity Tolerance:   Fair     After treatment:   Patient left in no apparent distress in bed, Call bell within reach, Bed/ chair alarm activated, Caregiver / family present, and Side rails x3    COMMUNICATION/EDUCATION:   The patient's plan of care was discussed with: physical therapist and registered nurse         Thank you for this referral.  Camelia Rendon OT  Minutes: 35    Occupational Therapy Evaluation Charge Determination   History Examination Decision-Making   LOW Complexity : Brief history review  MEDIUM Complexity: 3-5 Performance deficits relating to physical, cognitive, or psychosocial skills that result in activity limitations and/or participation restrictions LOW Complexity: No comorbidities that affect functional and  no verbal  or physical assist needed to complete eval tasks   Based on the above components, the patient evaluation is determined to be of the following complexity level: Low

## 2025-03-02 NOTE — PLAN OF CARE
prompted and follow during functional activity within 7 days.     3/1/2025 1225 by Shaka Whittaker, PT  Outcome: Progressing

## 2025-03-02 NOTE — PROGRESS NOTES
[facility]IE NAY[facility]    Jeb Pereira  YOB: 1950          Assessment & Plan:     Oliguric ABRAHAM presumed secondary to ATN  S/p Bradycardic arrest  Lactic acidosis  Resp failure  S/p TAVR 2/18  Hypermag,phos improving    Rec:  S/p HD 2/28,3/1  Monitor UO  Monitor for renal recovery  Am labs   D/w Family at bedside, RN       Subjective:   CC: ABRAHAM  HPI: no sob on O2  S/p HD this am  chinchilla dced, had incontinent episode    Current Facility-Administered Medications   Medication Dose Route Frequency    [START ON 3/2/2025] famotidine (PEPCID) tablet 20 mg  20 mg Oral Daily    glucose chewable tablet 16 g  4 tablet Oral PRN    dextrose bolus 10% 125 mL  125 mL IntraVENous PRN    Or    dextrose bolus 10% 250 mL  250 mL IntraVENous PRN    glucagon injection 1 mg  1 mg SubCUTAneous PRN    dextrose 10 % infusion   IntraVENous Continuous PRN    insulin lispro (HUMALOG,ADMELOG) injection vial 0-4 Units  0-4 Units SubCUTAneous 4 times per day    amiodarone (CORDARONE) 450 mg in dextrose 5 % 250 mL infusion ([id number])  0.5 mg/min IntraVENous Continuous    heparin (porcine) injection 4,000 Units  4,000 Units IntraVENous PRN    heparin (porcine) injection 2,000 Units  2,000 Units IntraVENous PRN    heparin 25,000 units in dextrose 5% 250 mL (premix) infusion  5-30 Units/kg/hr IntraVENous Continuous    lidocaine 4 % external patch 1 patch  1 patch TransDERmal Daily    aspirin chewable tablet 81 mg  81 mg Oral Daily    clopidogrel (PLAVIX) tablet 75 mg  75 mg Oral Daily    lactulose (CHRONULAC) 10 GM/15ML solution 10 g  10 g Oral TID    sodium chloride flush 0.9 % injection 5-40 mL  5-40 mL IntraVENous 2 times per day    sodium chloride flush 0.9 % injection 5-40 mL  5-40 mL IntraVENous PRN    0.9 % sodium chloride infusion   IntraVENous PRN    potassium chloride 20 mEq/50 mL IVPB (Central Line)  20 mEq IntraVENous PRN    Or    potassium chloride 10 mEq/100 mL IVPB  (Peripheral Line)  10 mEq IntraVENous PRN    magnesium sulfate 2000 mg in 50 mL IVPB premix  2,000 mg IntraVENous PRN    ondansetron (ZOFRAN-ODT) disintegrating tablet 4 mg  4 mg Oral Q8H PRN    Or    ondansetron (ZOFRAN) injection 4 mg  4 mg IntraVENous Q6H PRN    polyethylene glycol (GLYCOLAX) packet 17 g  17 g Oral Daily PRN    acetaminophen (TYLENOL) tablet 650 mg  650 mg Oral Q6H PRN    Or    acetaminophen (TYLENOL) suppository 650 mg  650 mg Rectal Q6H PRN          Objective:     Vitals:  Blood pressure 113/65, pulse 69, temperature 97.8 °F (36.6 °C), temperature source Oral, resp. rate 12, height 1.829 m (6'), weight 75.1 kg (165 lb 9.1 oz), SpO2 91%.  Temp (24hrs), Av.9 °F (36.6 °C), Min:97.7 °F (36.5 °C), Max:98.2 °F (36.8 °C)      Intake and Output:  No intake/output data recorded.   0701 -  1900  In: 1572.2 [I.V.:572.2]  Out: 3265 [Urine:265]    Physical Exam:               GENERAL ASSESSMENT: NAD  HEENT: NCAT   CHEST: normal resp effort, On NC O2  HEART: S1S2 reg  ABDOMEN: Soft,NT  EXTREMITY: no EDEMA  NEURO: Grossly non focal          ECG/rhythm:    Data Review        Recent Labs     25  0013 25  0533 25  0604    137 135*   K 5.0 4.9 4.5   * 108 104   CO2 22 19* 25   BUN 55* 63* 54*   CREATININE 4.19* 4.56* 4.19*   GLUCOSE 123* 129* 139*   CALCIUM 8.3* 8.5 8.5           : Loraine Kovacs MD  3/1/2025        [facility]:  [email or web address]  [email or web address]    [place] office:  [address], [address]  [place] [address]  Phone: [phone]  Fax :     [phone]    [place] office:  [address] [place] [id number]  Phone - [phone]  Fax - [phone]

## 2025-03-02 NOTE — PROGRESS NOTES
[facility] CARDIOLOGY  Cardiology Care Note                  []Initial visit     [x]Established visit     Patient Name: Jeb Pereira - :1950 - MRN:[id number]  Primary Cardiologist: None DR. Gomez S  Consulting Cardiologist: Anish Manuel MD     Reason for initial visit: cardiac arrest     HPI:    Patient is a 74-year-old male who is status post TAVR approximately 1 week ago at [facility] (Dr. Long).  Per family, patient has been doing well until this morning when daughter states that he had a fall.  Initially when she went to his side, he was able to talk but then passed out.  By the time 911 had been called and CPR/drugs were initiated.  ROSC was obtained.  Patient came to the ED.  Patient had to be resuscitated again in the ED-VT/V-fib also noted.  ROSC obtained.  Prolonged downtime (approximately 60 to 90 minutes).     As per Care Everywhere notes-patient had TAVR and had a trans venous temporary pacemaker for a day.  Per family-patient had heart catheterization prior to TAVR and was told that he did not have any significant blockages.  There is also mention of left bundle branch block in prior notes.    Now intubated/sedated with propofol.  Requiring epi gtt 2 mcg, Levophed gtt and Amio gtt 0.5 mg/min.  Per ER reports, pt had VT again while in ER.    Down time is approximated around 1 hr-1.5 hrs.    Head CT non-contrast, negative for acute process.  Pt has extensive bruising to left and back side of head.      In ER,  pt noted to have CHF with AV dissociation.      Pt is s/p TAVR 1 week at [facility] by Dr. Long/Dr. Gomez.  Per care everywhere, it was noted pt did develop LBBB intraprocedure and TVP was placed.   Per notes, pt did not require pacing and wire was removed POD1.       Wife reports LHC 2 weeks prior to TAVr, with reports of NO CAD.         25 - Extubated    Subjective:    Feeling better - denies CP  or SOB -- but feels weak        Assessment and Plan     Cardiac arrest in setting of bradycardia / CHB  on 2/26/25   - s/p TAVR 2/18/25 at [facility]  - Status post temporary transvenous pacemaker 2/26/2025 by Dr. Sena.  EP (Dr. Delgado) considering BiV-ICD, however wanted to wait and see where patient went regarding dialysis and let other issues settle.   - Had LHC prior to TAVR, with no sig CAD.    - Will defer to Dr. Bridges and Cardiology team whether we repeat a cardiac cath next week     Cardiogenic shock in setting of cardiac arrest:   - Now off Epi and dobutamine gtts.   - Echo 2/26/25 - LVEF 15 to 20%.    - Echo 2/28/25 - LVEF 50%  - BP improved   - Regarding GDMT for cardiomyopathy ---> Cannot use a BB due to heart block.  Unable to add Entreso, Jardiance, MRA due to ABRAHAM.      Acute hypoxic resp failure:Improved/Extubated 2/27/25    ABRAHAM -  - Nephrology seeing pt - has had sessions of HD    Elevated LFT's - related to shock     Paroxysmal Afib   - Afib with RVR noted on  2/28/25 - happened while patient on dobutamine --> amiodarone started and dobutamine stopped then.    - IV heparin started   - currently in sinus          Tele - NSR      ____________________________________________________________      Cardiac testing    Cardiac Cath 1/29/25 - mild non-obstructive CAD      ECHO (TTE) 02/26/2025    Interpretation Summary    Left Ventricle: Severely reduced left ventricular systolic function. EF by 2D Simpsons Biplane is 18%. Left ventricle size is normal. Severely increased wall thickness. Severely increased ventricular mass. Findings consistent with concentric hypertrophy. Grade III diastolic dysfunction with increased LAP. 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    Right Ventricle: Right ventricle size is normal. Increased wall thickness. Moderately reduced systolic function. TAPSE is abnormal. TAPSE is 1.1 cm.    Mitral Valve: Mild to moderate

## 2025-03-02 NOTE — PROGRESS NOTES
Critical Care Progress Note  García Christian MD          Date of Service:  3/2/2025  NAME:  Jeb Pereira  :  1950  MRN:  [id number]      Subjective/Hospital course:        Patient had underwent TAVR at [facility] last week with prior normal LHC and was discharged home on DAPT that he has been compliant to. Wife reports no other medication and patient has been doing fine until this AM when she heard her  falling and found him on the ground. She called 911. Shortly before EMS arrived, patient became unresponsive, pulseless and cyanosed. EMS started CPR and continued for almost an hour in field with shockable rhythm and suspicion of torsades. Patient was shocked multiple times and brought in ER where CPR continued and total CPR time around 80-90 mins.   EKG in ER is showing bradycardia with wide complex, junctional rhythm. Patient was started on Epi and Levophed added.   CT head with no bleeding. ICU service was called for ICU admission.   On my initial evaluation in ER, patient was on 20 mcg of levophed and 4 mcg of Epi with amiodarone eat rate of 1 mg per minute.   Patient on no sedation, not responsive but breathing over the vent.   Stat echo and cardiology consult placed. Echo reported as reduced LV function and EF 18%, grade 3 DD, concentric LVH and increased LV mass, highly suspicious for infiltrative CMP.    On arrival in ICU, failed to wean epi and patient became hypotensive with intermittent episodes of bradycardia and Epi drip was started back.   Patient was evaluated by cardiology and in evening taken to cath lab for pacemaker insertion.       - more alert. Able to follow simple command. Epi at 3    - no acute events overnight. Started on dobutamine yesterday, EPI off.  Early this a.m had NSVT on 5 dobutamine, weaned down to 2.5   Discussed with Nephro who would like to proceed with HD as there has been no significant improvement in UOP /cr   3/1: Patient  (GLYCOLAX) packet 17 g  17 g Oral Daily PRN    acetaminophen (TYLENOL) tablet 650 mg  650 mg Oral Q6H PRN    Or    acetaminophen (TYLENOL) suppository 650 mg  650 mg Rectal Q6H PRN     ______________________________________________________________________  EXPECTED LENGTH OF STAY: 7  ACTUAL LENGTH OF STAY:          4                 García Christian MD   [facility]

## 2025-03-03 ENCOUNTER — APPOINTMENT (OUTPATIENT)
Facility: HOSPITAL | Age: 75
End: 2025-03-03
Payer: MEDICARE

## 2025-03-03 PROBLEM — I50.22 CHRONIC SYSTOLIC CONGESTIVE HEART FAILURE (HCC): Status: ACTIVE | Noted: 2025-03-03

## 2025-03-03 LAB
ALBUMIN SERPL ELPH-MCNC: 3 G/DL (ref 2.9–4.4)
ALBUMIN/GLOB SERPL: 1.5 (ref 0.7–1.7)
ALPHA1 GLOB SERPL ELPH-MCNC: 0.3 G/DL (ref 0–0.4)
ALPHA2 GLOB SERPL ELPH-MCNC: 0.4 G/DL (ref 0.4–1)
ANION GAP SERPL CALC-SCNC: 11 MMOL/L (ref 2–12)
B-GLOBULIN SERPL ELPH-MCNC: 0.7 G/DL (ref 0.7–1.3)
BUN SERPL-MCNC: 59 MG/DL (ref 6–20)
BUN/CREAT SERPL: 14 (ref 12–20)
C-ANCA TITR SER IF: NORMAL TITER
CALCIUM SERPL-MCNC: 8 MG/DL (ref 8.5–10.1)
CHLORIDE SERPL-SCNC: 96 MMOL/L (ref 97–108)
CO2 SERPL-SCNC: 23 MMOL/L (ref 21–32)
COMMENT:: NORMAL
CREAT SERPL-MCNC: 4.13 MG/DL (ref 0.7–1.3)
ECHO BSA: 1.96 M2
ECHO BSA: 1.96 M2
ECHO LV EDV A2C: 145 ML
ECHO LV EDV A4C: 107 ML
ECHO LV EDV BP: 135 ML (ref 67–155)
ECHO LV EDV INDEX A4C: 54 ML/M2
ECHO LV EDV INDEX BP: 69 ML/M2
ECHO LV EDV NDEX A2C: 74 ML/M2
ECHO LV EF PHYSICIAN: 30 %
ECHO LV EJECTION FRACTION A2C: 35 %
ECHO LV EJECTION FRACTION A4C: 21 %
ECHO LV EJECTION FRACTION BIPLANE: 33 % (ref 55–100)
ECHO LV ESV A2C: 95 ML
ECHO LV ESV A4C: 85 ML
ECHO LV ESV BP: 90 ML (ref 22–58)
ECHO LV ESV INDEX A2C: 48 ML/M2
ECHO LV ESV INDEX A4C: 43 ML/M2
ECHO LV ESV INDEX BP: 46 ML/M2
ECHO LV FRACTIONAL SHORTENING: 14 % (ref 28–44)
ECHO LV INTERNAL DIMENSION DIASTOLE INDEX: 2.54 CM/M2
ECHO LV INTERNAL DIMENSION DIASTOLIC: 5 CM (ref 4.2–5.9)
ECHO LV INTERNAL DIMENSION SYSTOLIC INDEX: 2.18 CM/M2
ECHO LV INTERNAL DIMENSION SYSTOLIC: 4.3 CM
ECHO LV IVSD: 1.3 CM (ref 0.6–1)
ECHO LV MASS 2D: 247.6 G (ref 88–224)
ECHO LV MASS INDEX 2D: 125.7 G/M2 (ref 49–115)
ECHO LV POSTERIOR WALL DIASTOLIC: 1.2 CM (ref 0.6–1)
ECHO LV RELATIVE WALL THICKNESS RATIO: 0.48
GAMMA GLOB SERPL ELPH-MCNC: 0.7 G/DL (ref 0.4–1.8)
GLOBULIN SER CALC-MCNC: 2 G/DL (ref 2.2–3.9)
GLUCOSE SERPL-MCNC: 127 MG/DL (ref 65–100)
M PROTEIN SERPL ELPH-MCNC: ABNORMAL G/DL
MAGNESIUM SERPL-MCNC: 2.3 MG/DL (ref 1.6–2.4)
MYELOPEROXIDASE AB SER IA-ACNC: <0.2 UNITS (ref 0–0.9)
P-ANCA ATYPICAL TITR SER IF: NORMAL TITER
P-ANCA TITR SER IF: NORMAL TITER
PHOSPHATE SERPL-MCNC: 5 MG/DL (ref 2.6–4.7)
PHOSPHOLIPASE A2 RECEPTOR, IGG: <1.8 RU/ML (ref 0–19.9)
POTASSIUM SERPL-SCNC: 3.9 MMOL/L (ref 3.5–5.1)
PROT SERPL-MCNC: 5 G/DL (ref 6–8.5)
PROTEINASE3 AB SER IA-ACNC: <0.2 UNITS (ref 0–0.9)
SODIUM SERPL-SCNC: 130 MMOL/L (ref 136–145)
SPECIMEN HOLD: NORMAL
UFH PPP CHRO-ACNC: 0.24 IU/ML
UFH PPP CHRO-ACNC: 0.43 IU/ML

## 2025-03-03 PROCEDURE — B2151ZZ FLUOROSCOPY OF LEFT HEART USING LOW OSMOLAR CONTRAST: ICD-10-PCS | Performed by: INTERNAL MEDICINE

## 2025-03-03 PROCEDURE — 2500000003 HC RX 250 WO HCPCS: Performed by: INTERNAL MEDICINE

## 2025-03-03 PROCEDURE — B2111ZZ FLUOROSCOPY OF MULTIPLE CORONARY ARTERIES USING LOW OSMOLAR CONTRAST: ICD-10-PCS | Performed by: INTERNAL MEDICINE

## 2025-03-03 PROCEDURE — 99233 SBSQ HOSP IP/OBS HIGH 50: CPT | Performed by: INTERNAL MEDICINE

## 2025-03-03 PROCEDURE — 99152 MOD SED SAME PHYS/QHP 5/>YRS: CPT | Performed by: INTERNAL MEDICINE

## 2025-03-03 PROCEDURE — 93325 DOPPLER ECHO COLOR FLOW MAPG: CPT | Performed by: INTERNAL MEDICINE

## 2025-03-03 PROCEDURE — 94761 N-INVAS EAR/PLS OXIMETRY MLT: CPT

## 2025-03-03 PROCEDURE — APPSS30 APP SPLIT SHARED TIME 16-30 MINUTES: Performed by: NURSE PRACTITIONER

## 2025-03-03 PROCEDURE — C1769 GUIDE WIRE: HCPCS | Performed by: INTERNAL MEDICINE

## 2025-03-03 PROCEDURE — 4A023N7 MEASUREMENT OF CARDIAC SAMPLING AND PRESSURE, LEFT HEART, PERCUTANEOUS APPROACH: ICD-10-PCS | Performed by: INTERNAL MEDICINE

## 2025-03-03 PROCEDURE — 84100 ASSAY OF PHOSPHORUS: CPT

## 2025-03-03 PROCEDURE — 93308 TTE F-UP OR LMTD: CPT

## 2025-03-03 PROCEDURE — 76937 US GUIDE VASCULAR ACCESS: CPT | Performed by: INTERNAL MEDICINE

## 2025-03-03 PROCEDURE — 2580000003 HC RX 258: Performed by: INTERNAL MEDICINE

## 2025-03-03 PROCEDURE — 2500000003 HC RX 250 WO HCPCS: Performed by: HOSPITALIST

## 2025-03-03 PROCEDURE — 93454 CORONARY ARTERY ANGIO S&I: CPT | Performed by: INTERNAL MEDICINE

## 2025-03-03 PROCEDURE — 83735 ASSAY OF MAGNESIUM: CPT

## 2025-03-03 PROCEDURE — 80048 BASIC METABOLIC PNL TOTAL CA: CPT

## 2025-03-03 PROCEDURE — 2709999900 HC NON-CHARGEABLE SUPPLY: Performed by: INTERNAL MEDICINE

## 2025-03-03 PROCEDURE — 6370000000 HC RX 637 (ALT 250 FOR IP): Performed by: INTERNAL MEDICINE

## 2025-03-03 PROCEDURE — 36415 COLL VENOUS BLD VENIPUNCTURE: CPT

## 2025-03-03 PROCEDURE — 93308 TTE F-UP OR LMTD: CPT | Performed by: INTERNAL MEDICINE

## 2025-03-03 PROCEDURE — 6360000002 HC RX W HCPCS: Performed by: INTERNAL MEDICINE

## 2025-03-03 PROCEDURE — 2000000000 HC ICU R&B

## 2025-03-03 PROCEDURE — 6360000002 HC RX W HCPCS: Performed by: NURSE PRACTITIONER

## 2025-03-03 PROCEDURE — 85520 HEPARIN ASSAY: CPT

## 2025-03-03 PROCEDURE — 6360000004 HC RX CONTRAST MEDICATION: Performed by: INTERNAL MEDICINE

## 2025-03-03 PROCEDURE — 2700000000 HC OXYGEN THERAPY PER DAY

## 2025-03-03 RX ORDER — MIDAZOLAM HYDROCHLORIDE 1 MG/ML
INJECTION, SOLUTION INTRAMUSCULAR; INTRAVENOUS PRN
Status: DISCONTINUED | OUTPATIENT
Start: 2025-03-03 | End: 2025-03-03 | Stop reason: HOSPADM

## 2025-03-03 RX ORDER — HEPARIN SODIUM 1000 [USP'U]/ML
INJECTION, SOLUTION INTRAVENOUS; SUBCUTANEOUS PRN
Status: DISCONTINUED | OUTPATIENT
Start: 2025-03-03 | End: 2025-03-03 | Stop reason: HOSPADM

## 2025-03-03 RX ORDER — ACETAMINOPHEN 325 MG/1
650 TABLET ORAL EVERY 4 HOURS PRN
Status: DISCONTINUED | OUTPATIENT
Start: 2025-03-03 | End: 2025-03-08 | Stop reason: HOSPADM

## 2025-03-03 RX ORDER — IOPAMIDOL 755 MG/ML
INJECTION, SOLUTION INTRAVASCULAR PRN
Status: DISCONTINUED | OUTPATIENT
Start: 2025-03-03 | End: 2025-03-03 | Stop reason: HOSPADM

## 2025-03-03 RX ORDER — FENTANYL CITRATE 50 UG/ML
INJECTION, SOLUTION INTRAMUSCULAR; INTRAVENOUS PRN
Status: DISCONTINUED | OUTPATIENT
Start: 2025-03-03 | End: 2025-03-03 | Stop reason: HOSPADM

## 2025-03-03 RX ORDER — HEPARIN SODIUM 200 [USP'U]/100ML
INJECTION, SOLUTION INTRAVENOUS PRN
Status: DISCONTINUED | OUTPATIENT
Start: 2025-03-03 | End: 2025-03-03 | Stop reason: HOSPADM

## 2025-03-03 RX ORDER — VERAPAMIL HYDROCHLORIDE 2.5 MG/ML
INJECTION, SOLUTION INTRAVENOUS PRN
Status: DISCONTINUED | OUTPATIENT
Start: 2025-03-03 | End: 2025-03-03 | Stop reason: HOSPADM

## 2025-03-03 RX ADMIN — SODIUM CHLORIDE, PRESERVATIVE FREE 10 ML: 5 INJECTION INTRAVENOUS at 09:17

## 2025-03-03 RX ADMIN — AMIODARONE HYDROCHLORIDE 0.5 MG/MIN: 50 INJECTION, SOLUTION INTRAVENOUS at 21:17

## 2025-03-03 RX ADMIN — ASPIRIN 81 MG: 81 TABLET, CHEWABLE ORAL at 08:57

## 2025-03-03 RX ADMIN — CLOPIDOGREL BISULFATE 75 MG: 75 TABLET ORAL at 08:57

## 2025-03-03 RX ADMIN — HEPARIN SODIUM 2000 UNITS: 1000 INJECTION INTRAVENOUS; SUBCUTANEOUS at 04:44

## 2025-03-03 RX ADMIN — AMIODARONE HYDROCHLORIDE 0.5 MG/MIN: 50 INJECTION, SOLUTION INTRAVENOUS at 12:07

## 2025-03-03 RX ADMIN — SODIUM CHLORIDE, PRESERVATIVE FREE 10 ML: 5 INJECTION INTRAVENOUS at 21:18

## 2025-03-03 NOTE — DISCHARGE INSTRUCTIONS
Radial Cardiac Catheterization/Angiography Discharge Instructions   It is normal to feel tired the first couple days. Take it easy and follow the physician’s instructions.   CHECK THE CATHETER INSERTION SITE DAILY:   Remove the wrist dressing 24 hours after the procedure.   You may shower 24 hours after the procedure. Wash with soap and water and pat dry.   Gentle cleaning of the site with soap and water is sufficient, cover with a dry clean dressing or bandage. Do not apply creams or powders to the area.   No soaking the wrist for 3 days.   Leave the puncture site open to air after 24 hours post-procedure.   CALL THE PHYSICIANS:   If the site becomes red, swollen or feels warm to the touch   If there is bleeding or drainage or if there is unusual pain at the radial site.   If there is any minor oozing, you may apply a band-aid and remove after 12 hours.   If the bleeding continues, hold pressure with the middle finger against the puncture site and the thumb against the back of the wrist,call 911 to be transported to the hospital.   DO NOT DRIVE YOURSELF, OR HAVE ANYONE ELSE DRIVE YOU - CALL 911.   ACTIVITY:   For the first 24 hours do not manipulate the wrist.   No lifting, pushing or pulling over 3-5 pounds with the affected wrist for 7 daysand no straining the insertion site. Do not life grocery bags or the garbage can, do not run the vacuum  or  for 7 days.   Start with short walks as in the hospital and gradually increase as tolerated each day. It is recommended to walk 30 minutes 5-7 days per week.   Follow your physician’s instructions on activity. Avoid walking outside in extremes of heat or cold. Walk inside when it is cold and windy or hot and humid.   Things to keep in mind:   No driving for at least 24 hours, or as designated by your physician.   Limit the number of times you go up and down the stairs   Take rests and pace yourself with activity.   Be careful and do not strain with bowel  non-distended  Skin: No rashes or ulcers, skin turgor is good  Neuro:  Cranial nerves are grossly intact, no focal motor weakness, follows commands appropriately  Psych:  Good insight, oriented to person, place and time, alert

## 2025-03-03 NOTE — PROCEDURES
BRIEF PROCEDURE NOTE    Date of Procedure: 3/3/2025   Preoperative Diagnosis: Cardiac arrest/ hx of TAVR/Temp pacer wire  Postoperative Diagnosis: Non obstructive CAD    Procedure: Left heart cath, LV angiography, coronary angiography  Interventional Cardiologist: Anish Manuel MD  Assistant : none  Anesthesia: local + IV sedation  I administered moderate sedation throughout this procedure. An independent trained observer pushed medications at my direction, and monitored the patient’s level of consciousness and physiological status throughout.  Estimated Blood Loss: Minimal    Access: R Radial Access - 6 F sheath          Catheters: RCA : JR4                    LCA : JL4    Findings:     L Main: Large; Nml    LAD: Med; Mid diffuse 30-40%; Small D1/D2 - MLI    LCflex: Large; MLI; OM1 - Med; prox 30%    RI - Med; MLI    RCA: Med; Dominant; MLI; PDA and PLB - small; MLI    LVEDP: Valve not crossed ( hx of TAVR)    LVEF:    No significant gradient across aortic valve.    PCI: none    Specimens Removed : None    Devices implanted : None    Complications: None    Closure Device: R Radial - TR band        See full cath note.    Complications: none    Anish Manuel MD

## 2025-03-03 NOTE — PROGRESS NOTES
Critical Care Progress Note  García Christian MD          Date of Service:  3/3/2025  NAME:  Jeb Pereira  :  1950  MRN:  [id number]      Subjective/Hospital course:        Patient had underwent TAVR at [facility]  1 wk PTA prior normal LHC and was discharged home on DAPT that he has been compliant to. Wife reports no other medication and patient has been doing fine until this AM when she heard her  falling and found him on the ground. She called 911. Shortly before EMS arrived, patient became unresponsive, pulseless and cyanosed. EMS started CPR and continued for almost an hour in field with shockable rhythm and suspicion of torsades. Patient was shocked multiple times and brought in ER where CPR continued and total CPR time around 80-90 mins.   EKG in ER is showing bradycardia with wide complex, junctional rhythm. Patient was started on Epi and Levophed added.   CT head with no bleeding. ICU service was called for ICU admission.   On my initial evaluation in ER, patient was on 20 mcg of levophed and 4 mcg of Epi with amiodarone eat rate of 1 mg per minute.   Patient on no sedation, not responsive but breathing over the vent.   Stat echo and cardiology consult placed. Echo reported as reduced LV function and EF 18%, grade 3 DD, concentric LVH and increased LV mass, highly suspicious for infiltrative CMP.    On arrival in ICU, failed to wean epi and patient became hypotensive with intermittent episodes of bradycardia and Epi drip was started back.   Patient was evaluated by cardiology and in evening taken to cath lab for  temp pacemaker insertion. 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 Able to follow simple command. Epi at 3    - no acute events overnight. Started on dobutamine yesterday, EPI off.  Early this a.m had NSVT on 5 dobutamine, weaned down to 2.5 ubsequently dced  Started HD as there has been  stable.  Right IJ temporary pacer in place intermittently V paced..  Mild pallor no icterus no peripheral edema.  CVS: S1-S2 are normally normal was appreciated.  RS bilateral equal air entry problems with send appreciated.  Abdomen; soft nontender no organomegaly  R groin bruising with firm knot in R fem art area. Us was neg for any pseudoaneurysms  CNS: Nonfocal.    Labs and Imaging:   Reviewed.      Medications:     Current Facility-Administered Medications   Medication Dose Route Frequency    acetaminophen (TYLENOL) tablet 650 mg  650 mg Oral Q4H PRN    famotidine (PEPCID) tablet 20 mg  20 mg Oral Daily    glucose chewable tablet 16 g  4 tablet Oral PRN    dextrose bolus 10% 125 mL  125 mL IntraVENous PRN    Or    dextrose bolus 10% 250 mL  250 mL IntraVENous PRN    glucagon injection 1 mg  1 mg SubCUTAneous PRN    dextrose 10 % infusion   IntraVENous Continuous PRN    amiodarone (CORDARONE) 450 mg in dextrose 5 % 250 mL infusion (Txcp5Ito)  0.5 mg/min IntraVENous Continuous    heparin (porcine) injection 4,000 Units  4,000 Units IntraVENous PRN    heparin (porcine) injection 2,000 Units  2,000 Units IntraVENous PRN    heparin 25,000 units in dextrose 5% 250 mL (premix) infusion  5-30 Units/kg/hr IntraVENous Continuous    lidocaine 4 % external patch 1 patch  1 patch TransDERmal Daily    [Held by provider] aspirin chewable tablet 81 mg  81 mg Oral Daily    clopidogrel (PLAVIX) tablet 75 mg  75 mg Oral Daily    lactulose (CHRONULAC) 10 GM/15ML solution 10 g  10 g Oral TID    sodium chloride flush 0.9 % injection 5-40 mL  5-40 mL IntraVENous 2 times per day    sodium chloride flush 0.9 % injection 5-40 mL  5-40 mL IntraVENous PRN    0.9 % sodium chloride infusion   IntraVENous PRN    potassium chloride 20 mEq/50 mL IVPB (Central Line)  20 mEq IntraVENous PRN    Or    potassium chloride 10 mEq/100 mL IVPB (Peripheral Line)  10 mEq IntraVENous PRN    magnesium sulfate 2000 mg in 50 mL IVPB premix  2,000 mg

## 2025-03-03 NOTE — CARE COORDINATION
Care Management Progress Note    Reason for Admission:   Cardiac arrest (HCC) [I46.9]  Procedure(s) (LRB):  Coronary angiography (N/A)  Day of Surgery    Patient Admission Status: Inpatient  RUR: 14%  Hospitalization in the last 30 days (Readmission):  no        Transition of care plan:  [Medical]  S/p TAVR one week ago @ [facility]  On pressor support  Amiodarone gtt  Will need CRT -D implantation-plan is for implantation on Wednesday   Has temp pacer  [DC plan]  Following for discharge needs  Discharge plan communicated with patient and/or discharge caregiver: no    Date 1st [facility] letter given: 2/28/25  Outpatient follow-up.EP  Transport at discharge:  family  Sumi Chavez RN

## 2025-03-03 NOTE — PROGRESS NOTES
[facility] CARDIOLOGY  Cardiac Electrophysiology Note     []Initial Encounter     [x]Follow-up    Patient Name: Jeb Pereira - :1950 - MRN:[id number]  Primary Cardiologist: Dr. Gomez ([facility])  Consulting Cardiologist: Fanny Morgan MD, [facility], [facility]       Reason for encounter:   Cardiac Arrest      HPI:  Patient is a 74-year-old male who is status post TAVR approximately 1 week ago at [facility] (Dr. Long).  Per family, patient has been doing well until this morning when daughter states that he had a fall.  Initially when she went to his side, he was able to talk but then passed out.  By the time 911 had been called and CPR/drugs were initiated.  ROSC was obtained.  Patient came to the ED.  Patient had to be resuscitated again in the ED-VT/V-fib also noted.  ROSC obtained.  Prolonged downtime (approximately 60 to 90 minutes).     As per Care Everywhere notes-patient had TAVR and had a trans venous temporary pacemaker for a day.  Per family-patient had heart catheterization prior to TAVR and was told that he did not have any significant blockages.  There is also mention of left bundle branch block in prior notes.     Now intubated/sedated with propofol.  Requiring epi gtt 2 mcg, Levophed gtt and Amio gtt 0.5 mg/min.  Per ER reports, pt had VT again while in ER.    Down time is approximated around 1 hr-1.5 hrs.    Head CT non-contrast, negative for acute process.  Pt has extensive bruising to left and back side of head.       In ER,  pt noted to have CHF with AV dissociation.       Pt is s/p TAVR 1 week at [facility] by Dr. Long/Dr. Gomez.  Per care everywhere, it was noted pt did develop LBBB intraprocedure and TVP was placed.   Per notes, pt did not require pacing and wire was removed POD1.       Wife reports LHC 2 weeks prior to TAVr, with reports of NO CAD.           25 - Extubated    SUBJECTIVE:  No acute events, s/p LHC without any blockage.

## 2025-03-03 NOTE — PROGRESS NOTES
sulfate 2000 mg in 50 mL IVPB premix  2,000 mg IntraVENous PRN    ondansetron (ZOFRAN-ODT) disintegrating tablet 4 mg  4 mg Oral Q8H PRN    Or    ondansetron (ZOFRAN) injection 4 mg  4 mg IntraVENous Q6H PRN    polyethylene glycol (GLYCOLAX) packet 17 g  17 g Oral Daily PRN    acetaminophen (TYLENOL) tablet 650 mg  650 mg Oral Q6H PRN    Or    acetaminophen (TYLENOL) suppository 650 mg  650 mg Rectal Q6H PRN          Objective:     Vitals:  Blood pressure (!) 143/97, pulse 63, temperature 97.9 °F (36.6 °C), temperature source Oral, resp. rate 16, height 1.829 m (6'), weight 76.2 kg (167 lb 15.9 oz), SpO2 98%.  Temp (24hrs), Av.1 °F (36.7 °C), Min:97.9 °F (36.6 °C), Max:98.3 °F (36.8 °C)      Intake and Output:   07 -  1900  In: -   Out: 350 [Urine:350]   190 -  0700  In: 1034.5 [I.V.:1034.5]  Out: 975 [Urine:975]    Physical Exam:               GENERAL ASSESSMENT: NAD  HEENT: NCAT   CHEST: Unlabored  HEART: reg  EXTREMITY: no EDEMA  NEURO: Grossly non focal          ECG/rhythm:    Data Review        Recent Labs     25  0604 25  0320 25  0308   * 133* 130*   K 4.5 4.0 3.9    98 96*   CO2 25 27 23   BUN 54* 42* 59*   CREATININE 4.19* 3.43* 4.13*   GLUCOSE 139* 141* 127*   CALCIUM 8.5 8.2* 8.0*           : Evans Guzman MD  3/3/2025        [facility]:  [email or web address]  [email or web address]    [place] office:  [address]  [place] [address]  Phone: [phone]  Fax :     [phone]    [place] office:  [address] [place], [place] [address]  Phone - [phone]  Fax - [phone]

## 2025-03-03 NOTE — PROGRESS NOTES
Physical/Occupational therapy:    Chart reviewed in prep for therapy session. Pt off the floor at cath lab.     Hodan Meza, PT, DPT

## 2025-03-03 NOTE — PROGRESS NOTES
[facility] CARDIOLOGY  Cardiology Care Note                  []Initial visit     [x]Established visit     Patient Name: Jeb Pereira - :1950 - MRN:[id number]  Primary Cardiologist: Dr. Gomez [facility]  Consulting Cardiologist: Anish Manuel MD     Reason for initial visit: cardiac arrest     HPI:    Patient is a 74-year-old male who is status post TAVR approximately 1 week ago at [facility] (Dr. Long).  Per family, patient has been doing well until this morning when daughter states that he had a fall.  Initially when she went to his side, he was able to talk but then passed out.  By the time 911 had been called and CPR/drugs were initiated.  ROSC was obtained.  Patient came to the ED.  Patient had to be resuscitated again in the ED-VT/V-fib also noted.  ROSC obtained.  Prolonged downtime (approximately 60 to 90 minutes).     As per Care Everywhere notes-patient had TAVR and had a trans venous temporary pacemaker for a day.  Per family-patient had heart catheterization prior to TAVR and was told that he did not have any significant blockages.  There is also mention of left bundle branch block in prior notes.    Now intubated/sedated with propofol.  Requiring epi gtt 2 mcg, Levophed gtt and Amio gtt 0.5 mg/min.  Per ER reports, pt had VT again while in ER.    Down time is approximated around 1 hr-1.5 hrs.    Head CT non-contrast, negative for acute process.  Pt has extensive bruising to left and back side of head.      In ER,  pt noted to have CHF with AV dissociation.      Pt is s/p TAVR 1 week at [facility] by Dr. Long/Dr. Gomez.  Per care everywhere, it was noted pt did develop LBBB intraprocedure and TVP was placed.   Per notes, pt did not require pacing and wire was removed POD1.       Wife reports LHC 2 weeks prior to TAVr, with reports of NO CAD.         25 - Extubated    Subjective:    Feeling better - denies CP or  MD, 1 patch at 02/28/25 0801    aspirin chewable tablet 81 mg, 81 mg, Oral, Daily, Angeline Cavanaugh MD, 81 mg at 03/02/25 0753    clopidogrel (PLAVIX) tablet 75 mg, 75 mg, Oral, Daily, Angeline Cavanaugh MD, 75 mg at 03/02/25 0753    lactulose (CHRONULAC) 10 GM/15ML solution 10 g, 10 g, Oral, TID, Angeline Cavanaugh MD, 10 g at 03/01/25 1329    sodium chloride flush 0.9 % injection 5-40 mL, 5-40 mL, IntraVENous, 2 times per day, Alyssa Harrington MD, 10 mL at 03/02/25 2043    sodium chloride flush 0.9 % injection 5-40 mL, 5-40 mL, IntraVENous, PRN, Alyssa Harrington MD    0.9 % sodium chloride infusion, , IntraVENous, PRN, Alyssa Harrington MD    potassium chloride 20 mEq/50 mL IVPB (Central Line), 20 mEq, IntraVENous, PRN **OR** potassium chloride 10 mEq/100 mL IVPB (Peripheral Line), 10 mEq, IntraVENous, PRN, Alyssa Hrarington MD    magnesium sulfate 2000 mg in 50 mL IVPB premix, 2,000 mg, IntraVENous, PRN, Alyssa Harrington MD    ondansetron (ZOFRAN-ODT) disintegrating tablet 4 mg, 4 mg, Oral, Q8H PRN **OR** ondansetron (ZOFRAN) injection 4 mg, 4 mg, IntraVENous, Q6H PRN, Alyssa Harrington MD, 4 mg at 02/28/25 0512    polyethylene glycol (GLYCOLAX) packet 17 g, 17 g, Oral, Daily PRN, Alyssa Harrington MD    acetaminophen (TYLENOL) tablet 650 mg, 650 mg, Oral, Q6H PRN **OR** acetaminophen (TYLENOL) suppository 650 mg, 650 mg, Rectal, Q6H PRN, Alyssa Harrington MD Susan Fisher, APRN - NP    [facility] Cardiology  Call center: (P) [phone] (f) [phone]

## 2025-03-04 LAB
ALBUMIN SERPL-MCNC: 2.7 G/DL (ref 3.5–5)
ALBUMIN/GLOB SERPL: 1 (ref 1.1–2.2)
ALP SERPL-CCNC: 122 U/L (ref 45–117)
ALT SERPL-CCNC: 755 U/L (ref 12–78)
ANION GAP SERPL CALC-SCNC: 7 MMOL/L (ref 2–12)
AST SERPL-CCNC: 185 U/L (ref 15–37)
BILIRUB DIRECT SERPL-MCNC: 0.4 MG/DL (ref 0–0.2)
BILIRUB SERPL-MCNC: 0.9 MG/DL (ref 0.2–1)
BUN SERPL-MCNC: 64 MG/DL (ref 6–20)
BUN/CREAT SERPL: 15 (ref 12–20)
CALCIUM SERPL-MCNC: 8.4 MG/DL (ref 8.5–10.1)
CHLORIDE SERPL-SCNC: 97 MMOL/L (ref 97–108)
CO2 SERPL-SCNC: 28 MMOL/L (ref 21–32)
COMMENT:: NORMAL
CREAT SERPL-MCNC: 4.3 MG/DL (ref 0.7–1.3)
ERYTHROCYTE [DISTWIDTH] IN BLOOD BY AUTOMATED COUNT: 11.9 % (ref 11.5–14.5)
GLOBULIN SER CALC-MCNC: 2.8 G/DL (ref 2–4)
GLUCOSE SERPL-MCNC: 139 MG/DL (ref 65–100)
HCT VFR BLD AUTO: 34.8 % (ref 36.6–50.3)
HCV AB SER IA-ACNC: 0.08 INDEX
HCV AB SERPL QL IA: NONREACTIVE
HGB BLD-MCNC: 12.2 G/DL (ref 12.1–17)
MAGNESIUM SERPL-MCNC: 2.1 MG/DL (ref 1.6–2.4)
MCH RBC QN AUTO: 32.4 PG (ref 26–34)
MCHC RBC AUTO-ENTMCNC: 35.1 G/DL (ref 30–36.5)
MCV RBC AUTO: 92.6 FL (ref 80–99)
NRBC # BLD: 0 K/UL (ref 0–0.01)
NRBC BLD-RTO: 0 PER 100 WBC
PHOSPHATE SERPL-MCNC: 4.8 MG/DL (ref 2.6–4.7)
PLATELET # BLD AUTO: 181 K/UL (ref 150–400)
PMV BLD AUTO: 10.7 FL (ref 8.9–12.9)
POTASSIUM SERPL-SCNC: 3.7 MMOL/L (ref 3.5–5.1)
PROT SERPL-MCNC: 5.5 G/DL (ref 6.4–8.2)
RBC # BLD AUTO: 3.76 M/UL (ref 4.1–5.7)
SODIUM SERPL-SCNC: 132 MMOL/L (ref 136–145)
SPECIMEN HOLD: NORMAL
UFH PPP CHRO-ACNC: 0.19 IU/ML
UFH PPP CHRO-ACNC: 0.28 IU/ML
UFH PPP CHRO-ACNC: 0.28 IU/ML
WBC # BLD AUTO: 13 K/UL (ref 4.1–11.1)

## 2025-03-04 PROCEDURE — 36415 COLL VENOUS BLD VENIPUNCTURE: CPT

## 2025-03-04 PROCEDURE — 6360000002 HC RX W HCPCS: Performed by: NURSE PRACTITIONER

## 2025-03-04 PROCEDURE — 85027 COMPLETE CBC AUTOMATED: CPT

## 2025-03-04 PROCEDURE — 97116 GAIT TRAINING THERAPY: CPT

## 2025-03-04 PROCEDURE — 86803 HEPATITIS C AB TEST: CPT

## 2025-03-04 PROCEDURE — 2000000000 HC ICU R&B

## 2025-03-04 PROCEDURE — 99233 SBSQ HOSP IP/OBS HIGH 50: CPT | Performed by: INTERNAL MEDICINE

## 2025-03-04 PROCEDURE — 2700000000 HC OXYGEN THERAPY PER DAY

## 2025-03-04 PROCEDURE — 97530 THERAPEUTIC ACTIVITIES: CPT

## 2025-03-04 PROCEDURE — 6370000000 HC RX 637 (ALT 250 FOR IP): Performed by: NURSE PRACTITIONER

## 2025-03-04 PROCEDURE — 84100 ASSAY OF PHOSPHORUS: CPT

## 2025-03-04 PROCEDURE — 2580000003 HC RX 258: Performed by: INTERNAL MEDICINE

## 2025-03-04 PROCEDURE — 97112 NEUROMUSCULAR REEDUCATION: CPT

## 2025-03-04 PROCEDURE — 90935 HEMODIALYSIS ONE EVALUATION: CPT

## 2025-03-04 PROCEDURE — 80048 BASIC METABOLIC PNL TOTAL CA: CPT

## 2025-03-04 PROCEDURE — 86704 HEP B CORE ANTIBODY TOTAL: CPT

## 2025-03-04 PROCEDURE — 6360000002 HC RX W HCPCS: Performed by: HOSPITALIST

## 2025-03-04 PROCEDURE — 83735 ASSAY OF MAGNESIUM: CPT

## 2025-03-04 PROCEDURE — 2500000003 HC RX 250 WO HCPCS: Performed by: HOSPITALIST

## 2025-03-04 PROCEDURE — 6370000000 HC RX 637 (ALT 250 FOR IP): Performed by: INTERNAL MEDICINE

## 2025-03-04 PROCEDURE — 94761 N-INVAS EAR/PLS OXIMETRY MLT: CPT

## 2025-03-04 PROCEDURE — 80076 HEPATIC FUNCTION PANEL: CPT

## 2025-03-04 PROCEDURE — APPSS30 APP SPLIT SHARED TIME 16-30 MINUTES: Performed by: NURSE PRACTITIONER

## 2025-03-04 PROCEDURE — 85520 HEPARIN ASSAY: CPT

## 2025-03-04 PROCEDURE — 6360000002 HC RX W HCPCS: Performed by: INTERNAL MEDICINE

## 2025-03-04 RX ORDER — AMIODARONE HYDROCHLORIDE 200 MG/1
400 TABLET ORAL 2 TIMES DAILY
Status: DISCONTINUED | OUTPATIENT
Start: 2025-03-04 | End: 2025-03-08 | Stop reason: HOSPADM

## 2025-03-04 RX ADMIN — HEPARIN SODIUM 29 UNITS/KG/HR: 10000 INJECTION, SOLUTION INTRAVENOUS at 21:02

## 2025-03-04 RX ADMIN — HEPARIN SODIUM 27 UNITS/KG/HR: 10000 INJECTION, SOLUTION INTRAVENOUS at 10:13

## 2025-03-04 RX ADMIN — HEPARIN SODIUM 2000 UNITS: 1000 INJECTION INTRAVENOUS; SUBCUTANEOUS at 03:00

## 2025-03-04 RX ADMIN — AMIODARONE HYDROCHLORIDE 0.5 MG/MIN: 50 INJECTION, SOLUTION INTRAVENOUS at 11:08

## 2025-03-04 RX ADMIN — AMIODARONE HYDROCHLORIDE 400 MG: 200 TABLET ORAL at 12:50

## 2025-03-04 RX ADMIN — SODIUM CHLORIDE, PRESERVATIVE FREE 10 ML: 5 INJECTION INTRAVENOUS at 20:22

## 2025-03-04 RX ADMIN — HEPARIN SODIUM 2000 UNITS: 1000 INJECTION INTRAVENOUS; SUBCUTANEOUS at 10:14

## 2025-03-04 RX ADMIN — HYALURONIDASE (HUMAN RECOMBINANT) 150 UNITS: 150 INJECTION, SOLUTION SUBCUTANEOUS at 16:34

## 2025-03-04 RX ADMIN — HEPARIN SODIUM 2000 UNITS: 1000 INJECTION INTRAVENOUS; SUBCUTANEOUS at 18:18

## 2025-03-04 RX ADMIN — CLOPIDOGREL BISULFATE 75 MG: 75 TABLET ORAL at 08:12

## 2025-03-04 RX ADMIN — AMIODARONE HYDROCHLORIDE 400 MG: 200 TABLET ORAL at 20:21

## 2025-03-04 ASSESSMENT — PAIN SCALES - GENERAL
PAINLEVEL_OUTOF10: 0

## 2025-03-04 NOTE — PROGRESS NOTES
attended rounds in the ICU as part of the Interdisciplinary team where the patient's ongoing care was discussed. Please contact [facility] for further referrals.    Ernesto Arvizu MDiv  Staff   Paging Service [phone] (KAI)

## 2025-03-04 NOTE — CARE COORDINATION
Care Management Progress Note    Reason for Admission:   Cardiac arrest (HCC) [I46.9]  Procedure(s) (LRB):  Coronary angiography (N/A)  Ultrasound guided vascular access (N/A)  1 Day Post-Op    Patient Admission Status: Inpatient  RUR: 13%  Hospitalization in the last 30 days (Readmission):  no        Transition of care plan:  [Medical]  [DC plan]  Plan for CRT-D placement on Wednesday.  Transitioned to po amiodarone today  HD today  Discharge plan communicated with patient and/or discharge caregiver: no    Date 1st IMM letter given: 2/28/25  Outpatient follow-up.TBD  Transport at discharge: to be determined      Case Management will follow-up with pt after procedure on Wednesday.    Sumi Chavez RN

## 2025-03-04 NOTE — PROGRESS NOTES
Critical Care Progress Note  García Christian MD          Date of Service:  3/4/2025  NAME:  Jeb Pereira  :  1950  MRN:  [id number]      Subjective/Hospital course:        Patient had underwent TAVR at [facility]  1 wk PTA prior normal LHC and was discharged home on DAPT that he has been compliant with. Pt then passed out and had cardiac arrest-EMS started CPR and continued for almost an hour in field with shockable rhythm and suspicion of torsades. Patient was shocked multiple times and brought in ER where CPR continued and total CPR time around 80-90 mins.   EKG in ER is showing bradycardia with wide complex, junctional rhythm. Patient was started on Epi and Levophed added.   CT head with no bleeding. Patient was evaluated by cardiology and taken to cath lab for  temp pacemaker insertion. 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 Able to follow simple command. Epi at 3    - no acute events overnight. Started on dobutamine yesterday, EPI off.  Early this a.m had NSVT on 5 dobutamine, weaned down to 2.5 ubsequently dced  Started HD as there has been no significant improvement in UOP /cr   3/1: Patient had HD 1 L UF on , another liter on 3/1.  On amio and heparin since  for afib rvr  3/3: HD held  [facility] done:  Noted to have mid LAD 30 to 40% lesion.,  OM1 had 30% lesion.  Otherwise MLI rest of the coronaries.  3/4: Remains alert and oriented  No sob or cough or fevers. Made>1L u/o. Creat around 4.1--->4.3 today  Plan is for HD session and then temp dialysis juan luis removal  Npo from midnight. Hold Heparin from 4 am  Possible permanent pacemaker placement tomorrow.      Assessment/Plan:   # Sudden Cardiac arrest, at home. Unclear down time (mutiple different timelines noted in the history) when patient achieved ROSC and lost the pulse again. Multiple shocks given by EMS for shock-able rhythm VT/VF  #  glucagon injection 1 mg  1 mg SubCUTAneous PRN    dextrose 10 % infusion   IntraVENous Continuous PRN    heparin (porcine) injection 4,000 Units  4,000 Units IntraVENous PRN    heparin (porcine) injection 2,000 Units  2,000 Units IntraVENous PRN    heparin 25,000 units in dextrose 5% 250 mL (premix) infusion  5-30 Units/kg/hr IntraVENous Continuous    lidocaine 4 % external patch 1 patch  1 patch TransDERmal Daily    [Held by provider] aspirin chewable tablet 81 mg  81 mg Oral Daily    clopidogrel (PLAVIX) tablet 75 mg  75 mg Oral Daily    lactulose (CHRONULAC) 10 GM/15ML solution 10 g  10 g Oral TID    sodium chloride flush 0.9 % injection 5-40 mL  5-40 mL IntraVENous 2 times per day    sodium chloride flush 0.9 % injection 5-40 mL  5-40 mL IntraVENous PRN    0.9 % sodium chloride infusion   IntraVENous PRN    potassium chloride 20 mEq/50 mL IVPB (Central Line)  20 mEq IntraVENous PRN    Or    potassium chloride 10 mEq/100 mL IVPB (Peripheral Line)  10 mEq IntraVENous PRN    magnesium sulfate 2000 mg in 50 mL IVPB premix  2,000 mg IntraVENous PRN    ondansetron (ZOFRAN-ODT) disintegrating tablet 4 mg  4 mg Oral Q8H PRN    Or    ondansetron (ZOFRAN) injection 4 mg  4 mg IntraVENous Q6H PRN    polyethylene glycol (GLYCOLAX) packet 17 g  17 g Oral Daily PRN    acetaminophen (TYLENOL) suppository 650 mg  650 mg Rectal Q6H PRN     ______________________________________________________________________  EXPECTED LENGTH OF STAY: 8  ACTUAL LENGTH OF STAY:          6                 García Christian MD   [facility]

## 2025-03-04 NOTE — PLAN OF CARE
Problem: Occupational Therapy - Adult  Goal: By Discharge: Performs self-care activities at highest level of function for planned discharge setting.  See evaluation for individualized goals.  Description: FUNCTIONAL STATUS PRIOR TO ADMISSION:  The patient was independent and active at baseline.  HOME SUPPORT: Patient lived with his wife but didn't require assistance.    Occupational Therapy Goals:  Initiated 3/2/2025  1.  Patient will perform grooming with Supervision/set-up sitting EOB within 7 day(s).  2.  Patient will perform lower body dressing with Set-up and Supervision within 7 day(s).  3.  Patient will perform toilet transfers with Supervision  within 7 day(s).  4.  Patient will perform all aspects of toileting with Supervision within 7 day(s).  5.  Patient will participate in upper extremity therapeutic exercise/activities with Dickey for 5 minutes within 7 day(s).    6.  Patient will utilize energy conservation techniques during functional activities with verbal cues within 7 day(s).   7.  Patient will state PPM precautions when prompted and follow during functional activity within 7 days.   Outcome: Progressing     OCCUPATIONAL THERAPY TREATMENT  Patient: Jeb Pereira (74 y.o. male)  Date: 3/4/2025  Primary Diagnosis: Cardiac arrest (HCC) [I46.9]  Procedure(s) (LRB):  Coronary angiography (N/A)  Ultrasound guided vascular access (N/A) 1 Day Post-Op   Precautions: Fall Risk (pacemaker)                Chart, occupational therapy assessment, plan of care, and goals were reviewed.    ASSESSMENT  Patient is progressing in OT goals but remains limited by impaired attention/ memory/ insight/ safety awareness and impaired balance (with strong posterior lean in initial standing).  He participated well and is highly motivated to progress function.  He demonstrated WFL AROM/ strength/ coordination for ADLs and ambulated a few rounds at min-modA (see PT note).  Note he still has a temporary pacemaker and  assistance  Scooting: Contact guard assistance     Transfers:   Transfer Training  Transfer Training: Yes  Interventions: Safety awareness training;Verbal cues;Visual cues  Sit to Stand: Minimal assistance  Stand to Sit: Minimal assistance  Bed to Chair: Partial/Moderate assistance;Minimal assistance           Balance:     Balance  Sitting - Static: Good (unsupported)  Sitting - Dynamic: Fair (occasional);Good (unsupported)  Standing - Static: Constant support;Fair;Good (initially poor but progressed to good/fair)  Standing - Dynamic: Constant support;Fair  Pain Rating:  Patient did not report pain      Activity Tolerance:   Good,  HR 60s    After treatment:   Patient left in no apparent distress sitting up in chair, Call bell within reach, Bed/ chair alarm activated, and Caregiver / family present    COMMUNICATION/EDUCATION:   The patient's plan of care was discussed with: physical therapist and registered nurse         Thank you for this referral.  Talat Estrada OT  Minutes: 42

## 2025-03-04 NOTE — PLAN OF CARE
Problem: Physical Therapy - Adult  Goal: By Discharge: Performs mobility at highest level of function for planned discharge setting.  See evaluation for individualized goals.  Description: FUNCTIONAL STATUS PRIOR TO ADMISSION: Patient was independent and active without use of DME.    HOME SUPPORT PRIOR TO ADMISSION: The patient lived with his very supportive wife but did not require assistance.    Physical Therapy Goals  Initiated 3/1/2025  1.  Patient will move from supine to sit and sit to supine in bed with supervision/set-up within 7 day(s).    2.  Patient will perform sit to stand with supervision/set-up within 7 day(s).  3.  Patient will transfer from bed to chair and chair to bed with supervision/set-up using the least restrictive device within 7 day(s).  4.  Patient will ambulate with supervision/set-up for 150 feet with the least restrictive device within 7 day(s).   5:  Patient will state PPM precautions when prompted and follow during functional activity within 7 days.     Outcome: Progressing     PHYSICAL THERAPY TREATMENT    Patient: Jeb Pereira (74 y.o. male)  Date: 3/4/2025  Diagnosis: Cardiac arrest (HCC) [I46.9] Cardiac arrest (HCC)  Procedure(s) (LRB):  Coronary angiography (N/A)  Ultrasound guided vascular access (N/A) 1 Day Post-Op  Precautions: Restrictions/Precautions  Restrictions/Precautions: Fall Risk (pacemaker)            ASSESSMENT:  Patient continues to benefit from skilled PT services and is progressing towards goals. Patient continues to benefit from repeated education on safety, activity recommendations and precautions. He is used to being very active and independent and needs reinforcement to maintain recommendations given current medical status and functional impairments but is very cooperative and appreciative of the education. Patient demonstrated improved ability to mobilize to edge of bed but required balance retraining upon standing at edge of bed due to significant  distress sitting up in chair, Call bell within reach, Bed/ chair alarm activated, and Caregiver / family present      COMMUNICATION/EDUCATION:   The patient's plan of care was discussed with: occupational therapist, registered nurse, and     Patient Education  Education Given To: Patient;Family  Education Provided: Role of Therapy;Plan of Care;Orientation;Precautions;Home Exercise Program;Transfer Training;Mobility Training;Fall Prevention Strategies;Family Education  Education Provided Comments: PPM precautions; balance retraining  Education Method: Verbal;Teach Back  Barriers to Learning: Cognition  Education Outcome: Continued education needed;Verbalized understanding      Jasmyne Preston, PT  Minutes: 42

## 2025-03-04 NOTE — FLOWSHEET NOTE
Primary RN SBAR: Yesi Riley RN  Incapacitated Nurse [facility]. provided: Yes  Patient Education provided: HD treatment procedure  Preferred Education method and Primary language: English/Verbal  Hospital General Consent Verified: yes  Hospital associated wait time; reason: NA  Hepatitis B Surface Ag   Date/Time Value Ref Range Status   02/28/2025 01:03 PM <0.10 Index Final     Hep B S Ag Interp   Date/Time Value Ref Range Status   02/28/2025 01:03 PM Negative NEG   Final     Hep B S Ab   Date/Time Value Ref Range Status   02/28/2025 01:03 PM 8.17 mIU/mL Final     Hep B S Ab Interp   Date/Time Value Ref Range Status   02/28/2025 01:03 PM NONREACTIVE NR   Final     Comment:     (NOTE)  The [employer]aur Anti-HBs2 assay is traceable to the World Health   Organization (WHO) Hepatitis B Immunoglobulin 1st International   Reference Preparation (1977). Samples with a calculated value of 10   mIU/mL or greater are considered reactive (protective) in accordance   with the CDC guidelines. The accepted criteria for immunity to HBV is   anti-HBs activity greater than or equal to 10 mIU/mL, as defined by   the WHO International Reference Preparation.  Assay performance has not been established in pregnant women,   patients who are immunosuppressed or immunocompromised, nor have   performance characteristics been established in conjunction with   other 's assays for specific HBV serologic markers. This   assay does not differentiate between vaccine induced immune response   and a response due to infection with HBV. Passively acquired anti-HBs   may be identified following patient transfusion, receipt of   immunoglobulin products, etc.          PRE-HD Assessment     03/04/25 1100   Vital Signs   BP (!) 141/105   Pulse 64   Respirations 19   SpO2 (!) 86 %   Pain Assessment   Pain Assessment None - Denies Pain   Treatment   Time On 1130   Treatment Goal 1L/3.5hrs   Observations & Evaluations   Level of Consciousness 0

## 2025-03-04 NOTE — PROGRESS NOTES
[facility]    Jeb Pereira  YOB: 1950          Assessment & Plan:     Oliguric --> nonoliguric ABRAHAM presumed secondary to ATN  S/p Bradycardic arrest  Lactic acidosis  Resp failure  S/p TAVR 2/18    Rec:  UOP excellent now. Cr up 4.1-4.3 today  Will run HD today. Ok to remove HD cath after that. Will then observe and see if he needs further dialysis  Needs HD cath removed for pacemaker placement       Subjective:   CC: ABRAHAM  HPI: For HD today. Plans for pacemaker placement tomorrow.  Current Facility-Administered Medications   Medication Dose Route Frequency    amiodarone (CORDARONE) tablet 400 mg  400 mg Oral BID    acetaminophen (TYLENOL) tablet 650 mg  650 mg Oral Q4H PRN    famotidine (PEPCID) tablet 20 mg  20 mg Oral Daily    glucose chewable tablet 16 g  4 tablet Oral PRN    dextrose bolus 10% 125 mL  125 mL IntraVENous PRN    Or    dextrose bolus 10% 250 mL  250 mL IntraVENous PRN    glucagon injection 1 mg  1 mg SubCUTAneous PRN    dextrose 10 % infusion   IntraVENous Continuous PRN    amiodarone (CORDARONE) 450 mg in dextrose 5 % 250 mL infusion ([id number])  0.5 mg/min IntraVENous Continuous    heparin (porcine) injection 4,000 Units  4,000 Units IntraVENous PRN    heparin (porcine) injection 2,000 Units  2,000 Units IntraVENous PRN    heparin 25,000 units in dextrose 5% 250 mL (premix) infusion  5-30 Units/kg/hr IntraVENous Continuous    lidocaine 4 % external patch 1 patch  1 patch TransDERmal Daily    [Held by provider] aspirin chewable tablet 81 mg  81 mg Oral Daily    clopidogrel (PLAVIX) tablet 75 mg  75 mg Oral Daily    lactulose (CHRONULAC) 10 GM/15ML solution 10 g  10 g Oral TID    sodium chloride flush 0.9 % injection 5-40 mL  5-40 mL IntraVENous 2 times per day    sodium chloride flush 0.9 % injection 5-40 mL  5-40 mL IntraVENous PRN    0.9 % sodium chloride infusion   IntraVENous PRN    potassium chloride 20 mEq/50 mL IVPB  (Central Line)  20 mEq IntraVENous PRN    Or    potassium chloride 10 mEq/100 mL IVPB (Peripheral Line)  10 mEq IntraVENous PRN    magnesium sulfate 2000 mg in 50 mL IVPB premix  2,000 mg IntraVENous PRN    ondansetron (ZOFRAN-ODT) disintegrating tablet 4 mg  4 mg Oral Q8H PRN    Or    ondansetron (ZOFRAN) injection 4 mg  4 mg IntraVENous Q6H PRN    polyethylene glycol (GLYCOLAX) packet 17 g  17 g Oral Daily PRN    acetaminophen (TYLENOL) suppository 650 mg  650 mg Rectal Q6H PRN          Objective:     Vitals:  Blood pressure (!) 150/83, pulse 70, temperature 97.9 °F (36.6 °C), temperature source Oral, resp. rate 18, height 1.829 m (6'), weight 75.8 kg (167 lb), SpO2 95%.  Temp (24hrs), Av.9 °F (36.6 °C), Min:97.6 °F (36.4 °C), Max:98.1 °F (36.7 °C)      Intake and Output:  No intake/output data recorded.   1901 -  0700  In: 984.5 [I.V.:984.5]  Out: 2655 [Urine:2650]    Physical Exam:               GENERAL ASSESSMENT: NAD  NECK: LIJ Temp HD cath   CHEST: Unlabored  HEART: reg  EXTREMITY: no EDEMA  NEURO: Grossly non focal          ECG/rhythm:    Data Review        Recent Labs     25  0320 25  0308 25  0822   * 130* 132*   K 4.0 3.9 3.7   CL 98 96* 97   CO2 27 23 28   BUN 42* 59* 64*   CREATININE 3.43* 4.13* 4.30*   GLUCOSE 141* 127* 139*   CALCIUM 8.2* 8.0* 8.4*           : Evans Guzman MD  3/4/2025        [facility]:  [email or web address]  [email or web address]    [place] office:  [address]  [place] [address]  Phone: [phone]  Fax :     [phone]    [place] office:  [address] [place] [address]  Phone - [phone]  Fax - [phone]

## 2025-03-04 NOTE — PROGRESS NOTES
[facility] CARDIOLOGY  Cardiology Care Note                  []Initial visit     [x]Established visit     Patient Name: Jeb Pereira - :1950 - MRN:[id number]  Primary Cardiologist: Dr. Gomez [facility]  Consulting Cardiologist: Anish Manuel MD     Reason for initial visit: cardiac arrest     HPI:    Patient is a 74-year-old male who is status post TAVR approximately 1 week ago at [facility] (Dr. Long).  Per family, patient has been doing well until this morning when daughter states that he had a fall.  Initially when she went to his side, he was able to talk but then passed out.  By the time 911 had been called and CPR/drugs were initiated.  ROSC was obtained.  Patient came to the ED.  Patient had to be resuscitated again in the ED-VT/V-fib also noted.  ROSC obtained.  Prolonged downtime (approximately 60 to 90 minutes).     As per Care Everywhere notes-patient had TAVR and had a trans venous temporary pacemaker for a day.  Per family-patient had heart catheterization prior to TAVR and was told that he did not have any significant blockages.  There is also mention of left bundle branch block in prior notes.    Now intubated/sedated with propofol.  Requiring epi gtt 2 mcg, Levophed gtt and Amio gtt 0.5 mg/min.  Per ER reports, pt had VT again while in ER.    Down time is approximated around 1 hr-1.5 hrs.    Head CT non-contrast, negative for acute process.  Pt has extensive bruising to left and back side of head.      In ER,  pt noted to have CHF with AV dissociation.      Pt is s/p TAVR 1 week at [facility] by Dr. Long/Dr. Gomez.  Per care everywhere, it was noted pt did develop LBBB intraprocedure and TVP was placed.   Per notes, pt did not require pacing and wire was removed POD1.       Wife reports LHC 2 weeks prior to TAVr, with reports of NO CAD.         25 - Extubated    Subjective:    Feeling better - denies CP or  (cardiac amyloidosis) cardiomyopathy.    Right Ventricle: Right ventricle size is normal. Increased wall thickness. Moderately reduced systolic function. TAPSE is abnormal. TAPSE is 1.1 cm.    Mitral Valve: Mild to moderate regurgitation.    Tricuspid Valve: Mild to moderate regurgitation.    Left Atrium: Left atrium is dilated. LA Vol Index A/L is 42 mL/m2.    Right Atrium: Right atrium is dilated.    Image quality is good. Technically difficult study, technically difficult study with poor endocardial visualization, technically difficult study due to patient's body habitus, technically difficult study due to patient's heart rhythm and procedure performed with the patient in a supine position.    Most recent HS troponins:  No results for input(s): \"TROPHS\", \"CKMB\" in the last 72 hours.      ECG:  SB-SR with wide QRS, AV dissociation on telemetry     Review of Systems:    []All other systems reviewed and all negative except as written in HPI    [x] Patient unable to provide secondary to condition    No past medical history on file.  Past Surgical History:   Procedure Laterality Date    CARDIAC PROCEDURE N/A 2/26/2025    Insert temporary pacemaker performed by Stuart Sena MD at [facility] CARDIAC CATH LAB    CARDIAC PROCEDURE N/A 3/3/2025    Coronary angiography performed by Anish Manuel MD at [facility] CARDIAC CATH LAB    INVASIVE VASCULAR N/A 2/26/2025    Ultrasound guided vascular access performed by Stuart Sean MD at [facility] CARDIAC CATH LAB    INVASIVE VASCULAR N/A 3/3/2025    Ultrasound guided vascular access performed by Anish Manuel MD at [facility] CARDIAC CATH LAB     Social Hx:    Family Hx: family history is not on file.  No Known Allergies       OBJECTIVE:  Wt Readings from Last 3 Encounters:   03/03/25 75.8 kg (167 lb)     Net IO Since Admission: -1,677.42 mL [03/04/25 1117]     Physical Exam:    Vitals:   Vitals:    03/04/25 0515 03/04/25 0530 03/04/25 0545 03/04/25 0825   BP:       Pulse: 61 65 70

## 2025-03-04 NOTE — CARE COORDINATION
03/04/25 1608   Condition of Participation: Discharge Planning   The Plan for Transition of Care is related to the following treatment goals: inpatient rehab   The Patient and/or Patient Representative was provided with a Choice of Provider? Patient;Patient Representative   Name of the Patient Representative who was provided with the Choice of Provider and agrees with the Discharge Plan?  wife-Toshia   The Patient and/Or Patient Representative agree with the Discharge Plan? Yes  ([facility] inpatient rehab and [facility])   Freedom of Choice list was provided with basic dialogue that supports the patient's individualized plan of care/goals, treatment preferences, and shares the quality data associated with the providers?  Yes     I discussed pt with PT/OT.  I met with pt and his wife to obtain preferences for inpatient rehab.  Order obtained from attending and order with pertinent clinicals faxed to [facility] and [facility].  Plan is for CRT-D placement tomorrow.  Pt could potentially be stable for discharge Thursday .    Sumi Chavez RN

## 2025-03-05 ENCOUNTER — APPOINTMENT (OUTPATIENT)
Facility: HOSPITAL | Age: 75
End: 2025-03-05
Payer: MEDICARE

## 2025-03-05 LAB
ANION GAP SERPL CALC-SCNC: 8 MMOL/L (ref 2–12)
BASOPHILS # BLD: 0.04 K/UL (ref 0–0.1)
BASOPHILS NFR BLD: 0.4 % (ref 0–1)
BUN SERPL-MCNC: 40 MG/DL (ref 6–20)
BUN/CREAT SERPL: 14 (ref 12–20)
CALCIUM SERPL-MCNC: 8.2 MG/DL (ref 8.5–10.1)
CHLORIDE SERPL-SCNC: 101 MMOL/L (ref 97–108)
CO2 SERPL-SCNC: 27 MMOL/L (ref 21–32)
COMMENT:: NORMAL
COMMENT:: NORMAL
CREAT SERPL-MCNC: 2.79 MG/DL (ref 0.7–1.3)
DIFFERENTIAL METHOD BLD: ABNORMAL
ECHO BSA: 1.96 M2
EKG ATRIAL RATE: 64 BPM
EKG DIAGNOSIS: NORMAL
EKG P AXIS: 57 DEGREES
EKG P-R INTERVAL: 168 MS
EKG Q-T INTERVAL: 480 MS
EKG QRS DURATION: 160 MS
EKG QTC CALCULATION (BAZETT): 495 MS
EKG R AXIS: -9 DEGREES
EKG T AXIS: 121 DEGREES
EKG VENTRICULAR RATE: 64 BPM
EOSINOPHIL # BLD: 0.22 K/UL (ref 0–0.4)
EOSINOPHIL NFR BLD: 2.1 % (ref 0–7)
ERYTHROCYTE [DISTWIDTH] IN BLOOD BY AUTOMATED COUNT: 12.3 % (ref 11.5–14.5)
GLUCOSE SERPL-MCNC: 144 MG/DL (ref 65–100)
HBV CORE AB SERPL QL IA: NEGATIVE
HCT VFR BLD AUTO: 35.3 % (ref 36.6–50.3)
HGB BLD-MCNC: 12.1 G/DL (ref 12.1–17)
IMM GRANULOCYTES # BLD AUTO: 0.05 K/UL (ref 0–0.04)
IMM GRANULOCYTES NFR BLD AUTO: 0.5 % (ref 0–0.5)
LYMPHOCYTES # BLD: 0.93 K/UL (ref 0.8–3.5)
LYMPHOCYTES NFR BLD: 8.7 % (ref 12–49)
MCH RBC QN AUTO: 32.5 PG (ref 26–34)
MCHC RBC AUTO-ENTMCNC: 34.3 G/DL (ref 30–36.5)
MCV RBC AUTO: 94.9 FL (ref 80–99)
MONOCYTES # BLD: 1.95 K/UL (ref 0–1)
MONOCYTES NFR BLD: 18.2 % (ref 5–13)
NEUTS SEG # BLD: 7.51 K/UL (ref 1.8–8)
NEUTS SEG NFR BLD: 70.1 % (ref 32–75)
NRBC # BLD: 0 K/UL (ref 0–0.01)
NRBC BLD-RTO: 0 PER 100 WBC
PLATELET # BLD AUTO: 193 K/UL (ref 150–400)
PMV BLD AUTO: 10.3 FL (ref 8.9–12.9)
POTASSIUM SERPL-SCNC: 3.6 MMOL/L (ref 3.5–5.1)
RBC # BLD AUTO: 3.72 M/UL (ref 4.1–5.7)
SODIUM SERPL-SCNC: 136 MMOL/L (ref 136–145)
SPECIMEN HOLD: NORMAL
SPECIMEN HOLD: NORMAL
UFH PPP CHRO-ACNC: 0.52 IU/ML
WBC # BLD AUTO: 10.7 K/UL (ref 4.1–11.1)

## 2025-03-05 PROCEDURE — 99152 MOD SED SAME PHYS/QHP 5/>YRS: CPT | Performed by: HOSPITALIST

## 2025-03-05 PROCEDURE — C1889 IMPLANT/INSERT DEVICE, NOC: HCPCS | Performed by: HOSPITALIST

## 2025-03-05 PROCEDURE — 2580000003 HC RX 258: Performed by: HOSPITALIST

## 2025-03-05 PROCEDURE — 85025 COMPLETE CBC W/AUTO DIFF WBC: CPT

## 2025-03-05 PROCEDURE — 36415 COLL VENOUS BLD VENIPUNCTURE: CPT

## 2025-03-05 PROCEDURE — 2720000010 HC SURG SUPPLY STERILE: Performed by: HOSPITALIST

## 2025-03-05 PROCEDURE — 02PA3MZ REMOVAL OF CARDIAC LEAD FROM HEART, PERCUTANEOUS APPROACH: ICD-10-PCS | Performed by: HOSPITALIST

## 2025-03-05 PROCEDURE — C1769 GUIDE WIRE: HCPCS | Performed by: HOSPITALIST

## 2025-03-05 PROCEDURE — 02HK3KZ INSERTION OF DEFIBRILLATOR LEAD INTO RIGHT VENTRICLE, PERCUTANEOUS APPROACH: ICD-10-PCS | Performed by: HOSPITALIST

## 2025-03-05 PROCEDURE — 97112 NEUROMUSCULAR REEDUCATION: CPT

## 2025-03-05 PROCEDURE — C1894 INTRO/SHEATH, NON-LASER: HCPCS | Performed by: HOSPITALIST

## 2025-03-05 PROCEDURE — 76937 US GUIDE VASCULAR ACCESS: CPT | Performed by: HOSPITALIST

## 2025-03-05 PROCEDURE — 71045 X-RAY EXAM CHEST 1 VIEW: CPT

## 2025-03-05 PROCEDURE — C1777 LEAD, AICD, ENDO SINGLE COIL: HCPCS | Performed by: HOSPITALIST

## 2025-03-05 PROCEDURE — 2500000003 HC RX 250 WO HCPCS: Performed by: HOSPITALIST

## 2025-03-05 PROCEDURE — 6370000000 HC RX 637 (ALT 250 FOR IP): Performed by: NURSE PRACTITIONER

## 2025-03-05 PROCEDURE — 2000000000 HC ICU R&B

## 2025-03-05 PROCEDURE — APPSS30 APP SPLIT SHARED TIME 16-30 MINUTES: Performed by: NURSE PRACTITIONER

## 2025-03-05 PROCEDURE — C1892 INTRO/SHEATH,FIXED,PEEL-AWAY: HCPCS | Performed by: HOSPITALIST

## 2025-03-05 PROCEDURE — 80048 BASIC METABOLIC PNL TOTAL CA: CPT

## 2025-03-05 PROCEDURE — 3E0102A INTRODUCTION OF ANTI-INFECTIVE ENVELOPE INTO SUBCUTANEOUS TISSUE, OPEN APPROACH: ICD-10-PCS | Performed by: HOSPITALIST

## 2025-03-05 PROCEDURE — 2709999900 HC NON-CHARGEABLE SUPPLY: Performed by: HOSPITALIST

## 2025-03-05 PROCEDURE — C1882 AICD, OTHER THAN SING/DUAL: HCPCS | Performed by: HOSPITALIST

## 2025-03-05 PROCEDURE — 97116 GAIT TRAINING THERAPY: CPT

## 2025-03-05 PROCEDURE — 0JH609Z INSERTION OF CARDIAC RESYNCHRONIZATION DEFIBRILLATOR PULSE GENERATOR INTO CHEST SUBCUTANEOUS TISSUE AND FASCIA, OPEN APPROACH: ICD-10-PCS | Performed by: HOSPITALIST

## 2025-03-05 PROCEDURE — 6360000002 HC RX W HCPCS: Performed by: HOSPITALIST

## 2025-03-05 PROCEDURE — 99232 SBSQ HOSP IP/OBS MODERATE 35: CPT | Performed by: INTERNAL MEDICINE

## 2025-03-05 PROCEDURE — C1898 LEAD, PMKR, OTHER THAN TRANS: HCPCS | Performed by: HOSPITALIST

## 2025-03-05 PROCEDURE — 85520 HEPARIN ASSAY: CPT

## 2025-03-05 PROCEDURE — 99153 MOD SED SAME PHYS/QHP EA: CPT | Performed by: HOSPITALIST

## 2025-03-05 PROCEDURE — 94761 N-INVAS EAR/PLS OXIMETRY MLT: CPT

## 2025-03-05 PROCEDURE — 2700000000 HC OXYGEN THERAPY PER DAY

## 2025-03-05 PROCEDURE — C1725 CATH, TRANSLUMIN NON-LASER: HCPCS | Performed by: HOSPITALIST

## 2025-03-05 PROCEDURE — 97530 THERAPEUTIC ACTIVITIES: CPT

## 2025-03-05 PROCEDURE — 6370000000 HC RX 637 (ALT 250 FOR IP): Performed by: HOSPITALIST

## 2025-03-05 PROCEDURE — 33249 INSJ/RPLCMT DEFIB W/LEAD(S): CPT | Performed by: HOSPITALIST

## 2025-03-05 PROCEDURE — 02HL3KZ INSERTION OF DEFIBRILLATOR LEAD INTO LEFT VENTRICLE, PERCUTANEOUS APPROACH: ICD-10-PCS | Performed by: HOSPITALIST

## 2025-03-05 PROCEDURE — 02H63KZ INSERTION OF DEFIBRILLATOR LEAD INTO RIGHT ATRIUM, PERCUTANEOUS APPROACH: ICD-10-PCS | Performed by: HOSPITALIST

## 2025-03-05 DEVICE — PACEMAKER PACK
Type: IMPLANTABLE DEVICE | Status: FUNCTIONAL
Brand: MEDLINE INDUSTRIES, INC.

## 2025-03-05 DEVICE — LEAD 5076-52 MRI US RCMCRD
Type: IMPLANTABLE DEVICE | Status: FUNCTIONAL
Brand: CAPSUREFIX NOVUS MRI™ SURESCAN®

## 2025-03-05 DEVICE — CRTD DTPA2D4 COBALT XT HF MRI DF4 USA
Type: IMPLANTABLE DEVICE | Status: FUNCTIONAL
Brand: COBALT™ XT HF CRT-D MRI SURESCAN™

## 2025-03-05 DEVICE — LEAD 6935M62 QUATTRO SECURE S MRI US
Type: IMPLANTABLE DEVICE | Status: FUNCTIONAL
Brand: SPRINT QUATTRO SECURE S MRI™ SURESCAN™

## 2025-03-05 DEVICE — LEAD 3830 US MKT/ 69CM MRI LBBAP
Type: IMPLANTABLE DEVICE | Status: FUNCTIONAL
Brand: SELECTSECURE™ MRI SURESCAN™

## 2025-03-05 DEVICE — ENVELOPE CMRM6133 ABSORB LRG MR
Type: IMPLANTABLE DEVICE | Status: FUNCTIONAL
Brand: TYRX™

## 2025-03-05 RX ORDER — FENTANYL CITRATE 50 UG/ML
INJECTION, SOLUTION INTRAMUSCULAR; INTRAVENOUS PRN
Status: DISCONTINUED | OUTPATIENT
Start: 2025-03-05 | End: 2025-03-05 | Stop reason: HOSPADM

## 2025-03-05 RX ORDER — CEFAZOLIN SODIUM 1 G/3ML
INJECTION, POWDER, FOR SOLUTION INTRAMUSCULAR; INTRAVENOUS PRN
Status: DISCONTINUED | OUTPATIENT
Start: 2025-03-05 | End: 2025-03-05 | Stop reason: HOSPADM

## 2025-03-05 RX ORDER — LIDOCAINE HYDROCHLORIDE 10 MG/ML
INJECTION, SOLUTION INFILTRATION; PERINEURAL PRN
Status: DISCONTINUED | OUTPATIENT
Start: 2025-03-05 | End: 2025-03-05 | Stop reason: HOSPADM

## 2025-03-05 RX ORDER — MIDAZOLAM HYDROCHLORIDE 1 MG/ML
INJECTION, SOLUTION INTRAMUSCULAR; INTRAVENOUS PRN
Status: DISCONTINUED | OUTPATIENT
Start: 2025-03-05 | End: 2025-03-05 | Stop reason: HOSPADM

## 2025-03-05 RX ADMIN — AMIODARONE HYDROCHLORIDE 400 MG: 200 TABLET ORAL at 10:57

## 2025-03-05 RX ADMIN — SODIUM CHLORIDE, PRESERVATIVE FREE 10 ML: 5 INJECTION INTRAVENOUS at 21:17

## 2025-03-05 RX ADMIN — SODIUM CHLORIDE, PRESERVATIVE FREE 10 ML: 5 INJECTION INTRAVENOUS at 10:58

## 2025-03-05 RX ADMIN — FAMOTIDINE 20 MG: 20 TABLET, FILM COATED ORAL at 10:57

## 2025-03-05 RX ADMIN — AMIODARONE HYDROCHLORIDE 400 MG: 200 TABLET ORAL at 21:28

## 2025-03-05 NOTE — PLAN OF CARE
anticipating CRT-D implant later today.  He was receptive to education on post-op LUE restrictions but will require re-education/ reinforcement.  Given significant physical progress today, anticipate he will no longer require rehab placement at d/c.  Will formally re-evaluate post-op but anticipate outpatient OT for cognition.         PLAN :  Patient continues to benefit from skilled intervention to address the above impairments.  Continue treatment per established plan of care to address goals.    Recommendation for discharge: (in order for the patient to meet his/her long term goals):   Continue to assess pending progress         SUBJECTIVE:   Patient stated “When can I do pushups?\"    OBJECTIVE DATA SUMMARY:   Cognitive/Behavioral Status:  Orientation  Orientation Level: Oriented to place;Oriented to person;Oriented to situation  Cognition  Cognition Comment: patient able to recite pacemaker precautions and is compliant with maintaining during session    Functional Mobility and Transfers for ADLs:  Bed Mobility:  Bed Mobility Training  Bed Mobility Training: Yes  Supine to Sit: Contact guard assistance  Scooting: Contact guard assistance     Transfers:   Transfer Training  Transfer Training: Yes  Interventions: Safety awareness training;Verbal cues;Visual cues  Sit to Stand: Contact guard assistance  Stand to Sit: Contact guard assistance           Balance:     Balance  Sitting - Static: Good (unsupported)  Sitting - Dynamic: Good (unsupported)  Standing - Static: Unsupported;Good  Standing - Dynamic: Unsupported;Fair      Pain Rating:  Patient did not report pain      Activity Tolerance:   Good.  HR 80s-90s with activity    After treatment:   Patient left in no apparent distress sitting up in chair, Call bell within reach, Bed/ chair alarm activated, and Caregiver / family present    COMMUNICATION/EDUCATION:   The patient's plan of care was discussed with: physical therapist and registered nurse         Thank  you for this referral.  Talat Estrada, OT  Minutes: 26

## 2025-03-05 NOTE — PROGRESS NOTES
Critical Care Progress Note  García Christian MD          Date of Service:  3/5/2025  NAME:  Jeb Pereira  :  1950  MRN:  [id number]      Subjective/Hospital course:        Patient had underwent TAVR at [facility]  1 wk PTA prior normal LHC and was discharged home on DAPT that he has been compliant with. Pt then passed out and had cardiac arrest-EMS started CPR and continued for almost an hour in field with shockable rhythm and suspicion of torsades. Patient was shocked multiple times and brought in ER where CPR continued and total CPR time around 80-90 mins.   EKG in ER is showing bradycardia with wide complex, junctional rhythm. Patient was started on Epi and Levophed added.   CT head with no bleeding. Patient was evaluated by cardiology and taken to cath lab for  temp pacemaker insertion. 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 Able to follow simple command. Epi at 3    - no acute events overnight. Started on dobutamine yesterday, EPI off.  Early this a.m had NSVT on 5 dobutamine, weaned down to 2.5 ubsequently dced  Started HD as there has been no significant improvement in UOP /cr   3/1: Patient had HD 1 L UF on , another liter on 3/1.  On amio and heparin since  for afib rvr  3/3: HD held  [facility] done:  Noted to have mid LAD 30 to 40% lesion.,  OM1 had 30% lesion.  Otherwise MLI rest of the coronaries.  3/4: Remains alert and oriented   Made>1L u/o. Creat around 4.1--->4.3 today  Plan is for HD session and then temp dialysis cath removed  3/5 : Had temp hd juan luis removed yday. Made 3 L urine  Heparin held from 4am. Going for permanent PCM insertion today and temp pcm removal  Denies any new symptoms. No cough or sob or chest pain  No abd pain  No other positive hx      Assessment/Plan:   # Sudden Cardiac arrest, at home. Unclear down time (mutiple different timelines noted in the history) when patient  agents.     Code status: full code  Family updated bedside on rounds          I personally spent 30minutes of critical care time.  This is time spent at this critically ill patient's bedside actively involved in patient care as well as the coordination of care and discussions with the patient's family.  This does not include any procedural time which has been billed separately.    Review of Systems:   Review of Systems  No new complaints       Vital Signs:   Patient Vitals for the past 4 hrs:   BP Pulse Resp SpO2 Height   03/05/25 1233 -- -- -- -- 1.829 m (6' 0.01\")   03/05/25 1100 (!) 149/101 64 28 97 % --        Intake/Output Summary (Last 24 hours) at 3/5/2025 1456  Last data filed at 3/5/2025 0911  Gross per 24 hour   Intake 509.61 ml   Output 3300 ml   Net -2790.39 ml        Physical Examination:     Patient is comfortable alert oriented, doing well on room air.  No increased work of breathing.  Vitals are as noted and are stable.  Right IJ temporary pacer in place intermittently V paced..  Mild pallor no icterus no peripheral edema.  CVS: S1-S2 are normally normal was appreciated.  RS bilateral equal air entry problems with send appreciated.  Abdomen; soft nontender no organomegaly  R groin bruising with firm knot in R fem art area. Us was neg for any pseudoaneurysms  CNS: Nonfocal.    Labs and Imaging:   Reviewed.      Medications:     Current Facility-Administered Medications   Medication Dose Route Frequency    amiodarone (CORDARONE) tablet 400 mg  400 mg Oral BID    acetaminophen (TYLENOL) tablet 650 mg  650 mg Oral Q4H PRN    famotidine (PEPCID) tablet 20 mg  20 mg Oral Daily    glucose chewable tablet 16 g  4 tablet Oral PRN    dextrose bolus 10% 125 mL  125 mL IntraVENous PRN    Or    dextrose bolus 10% 250 mL  250 mL IntraVENous PRN    glucagon injection 1 mg  1 mg SubCUTAneous PRN    dextrose 10 % infusion   IntraVENous Continuous PRN    lidocaine 4 % external patch 1 patch  1 patch TransDERmal Daily

## 2025-03-05 NOTE — PROGRESS NOTES
Comprehensive Nutrition Assessment    Type and Reason for Visit:  Initial, LOS    Nutrition Recommendations/Plan:   Resume cardiac diet.   When po diet is resumed: Provide Ensure High Protein BID (320 kcal, 38 g carbs, 32 g protein) Alternate flavors      Malnutrition Assessment:  Malnutrition Status:  Mild malnutrition (03/05/25 1238)    Context:  Acute Illness     Findings of the 6 clinical characteristics of malnutrition:  Energy Intake:  Mild decrease in energy intake  Weight Loss:  Mild weight loss     Body Fat Loss:  Unable to assess     Muscle Mass Loss:  Unable to assess    Fluid Accumulation:  Mild Extremities   Strength:  Not Performed    Nutrition Assessment:     Patient is a 74 year old male admitted with Cardiac arrest (HCC) [I46.9] and  has no past medical history on file.    Met with patient and wife bedside. They share that patients appetite has been at baseline since admission and that intake has been around 75%, partly due to frequent interruptions. Denies any N/V/D. Last documented BM 3.1, although wife states he's had one each day. They both ask questions about cardiac diet which I answered. Patient states a UBW of 150#. Bed scale on admission read 185# and now reads 167#, they both believe this is inaccurate and state he has recently lost weight. Edema likely elevating weight from true weight. Patient does not use any ONS at home but wishes to receive them to prevent further weight loss. When diet is resumed, can provide ensure hp BID to aid in meeting increased protein needs.     Nutrition Related Findings:      Wound Type: None       Meal Intake:   No data found.  Supplement Intake:  No data found.    Wt Readings from Last 10 Encounters:   03/03/25 75.8 kg (167 lb)     Last BM: 03/01/25  Edema: None   Edema Generalized: None  RUE Edema: +1  LUE Edema: +1          Nutr. Labs:    Lab Results   Component Value Date    CREATININE 2.79 (H) 03/05/2025    BUN 40 (H) 03/05/2025      11.3  Weight Adjustment For: No Adjustment                 BMI Categories: Normal Weight (BMI 22.0 to 24.9) age over 65    Estimated Daily Nutrient Needs:  Energy Requirements Based On: Kcal/kg  Weight Used for Energy Requirements: Current  Energy (kcal/day): 1895 (25 kcals/kg)  Weight Used for Protein Requirements: Current  Protein (g/day): 75-90 (1.0-1.2 gPRO/kg)  Method Used for Fluid Requirements: 1 ml/kcal  Fluid (ml/day): 1895    Nutrition Diagnosis:   Increased nutrient needs related to increase demand for energy/nutrients as evidenced by  (protein needs for age, s/p heart attack)    Nutrition Interventions:   Food and/or Nutrient Delivery: Start Oral Diet, Start Oral Nutrition Supplement  Nutrition Education/Counseling: No recommendation at this time  Coordination of Nutrition Care: Continue to monitor while inpatient, Interdisciplinary Rounds  Plan of Care discussed with: RD    Goals:  Goals: Initiate PO diet, by next RD assessment, PO intake 75% or greater  Type of Goal: New goal       Nutrition Monitoring and Evaluation:   Behavioral-Environmental Outcomes: None Identified  Food/Nutrient Intake Outcomes: Food and Nutrient Intake, Supplement Intake  Physical Signs/Symptoms Outcomes: Weight, Biochemical Data    Discharge Planning:    Continue Oral Nutrition Supplement     Lonnie Long Dietetic Intern   RD Office, ext: [phone]

## 2025-03-05 NOTE — CARE COORDINATION
1:50 PM  CM received a message from [facility], they are able to accept the patient when he is ready for dc. CM spoke to patient and his spouse, [facility] is choice for when he is ready for dc and medically stable.     3/5/25  11:54 AM    Care Management Progress Note    Reason for Admission:   Cardiac arrest (HCC) [I46.9]  Procedure(s) (LRB):  Coronary angiography (N/A)  Ultrasound guided vascular access (N/A)  2 Days Post-Op    Patient Admission Status: Inpatient  RUR: 12%  Hospitalization in the last 30 days (Readmission):  No        Transition of care plan:  Ongoing medical management  Discharge plan:  PT/OT recommending IPR  Referrals placed with JW and [facility]  JW admissions would like to see patient work with PT/OT following pacemaker placement  [facility] following  Discharge plan communicated with patient and/or discharge caregiver: Yes    Date 1st IMM letter given: 2/28/25  Outpatient follow-up.  Transport at discharge: DOMO Lockwood MSW  [facility]      Available via [employer]

## 2025-03-05 NOTE — PLAN OF CARE
Problem: Physical Therapy - Adult  Goal: By Discharge: Performs mobility at highest level of function for planned discharge setting.  See evaluation for individualized goals.  Description: FUNCTIONAL STATUS PRIOR TO ADMISSION: Patient was independent and active without use of DME.    HOME SUPPORT PRIOR TO ADMISSION: The patient lived with his very supportive wife but did not require assistance.    Physical Therapy Goals  Initiated 3/1/2025  1.  Patient will move from supine to sit and sit to supine in bed with supervision/set-up within 7 day(s).    2.  Patient will perform sit to stand with supervision/set-up within 7 day(s).  3.  Patient will transfer from bed to chair and chair to bed with supervision/set-up using the least restrictive device within 7 day(s).  4.  Patient will ambulate with supervision/set-up for 150 feet with the least restrictive device within 7 day(s).   5:  Patient will state PPM precautions when prompted and follow during functional activity within 7 days.     Outcome: Progressing     PHYSICAL THERAPY TREATMENT    Patient: Jeb Pereira (74 y.o. male)  Date: 3/5/2025  Diagnosis: Cardiac arrest (HCC) [I46.9] Cardiac arrest (HCC)  Procedure(s) (LRB):  Coronary angiography (N/A)  Ultrasound guided vascular access (N/A) 2 Days Post-Op  Precautions: Restrictions/Precautions  Restrictions/Precautions: Fall Risk (pacemaker)            ASSESSMENT:  Patient continues to benefit from skilled PT services and is progressing towards goals. Patient is able to recite UE precautions for upcoming pacemaker insertion this afternoon. He tolerated activity well with good tolerance to progress functional activities this date. Patient was able to ambulate increased distances but frequently with left lateral trunk sway and weight shift during gait needing intermittent assist to maintain stability. Dynamic balance challenges performed with good improvement with exercises and retraining but remains at risk for

## 2025-03-05 NOTE — PROCEDURES
ELECTROPHYSIOLOGY PROCEDURE     PROCEDURE DATE: 3/5/2025    OPERATION PERFORMED:    -Left Axillary venous access (ultrasound-guided)   -Biventricular Transvenous Defibrillator Implantation (LBB-Area, [employer], left-sided)   -Tyrx Pouch Placement  -Removal of right IJ temporary pacing lead    LAB PHYSICIAN: Stuart Sena MD    COMPLICATIONS: None.      TIME OUT: Time out was completed with verification of the correct patient identity, procedure to be performed, procedure site and implanted equipment.      INDICATION:  Cardiac arrest  High-grade AV block  S/p TAVR 2/18/25 at [facility]  New left bundle branch block after TAVR  Heart failure with reduced ejection fraction, LVEF 30-35%  Paroxysmal atrial fibrillation with RVR only detected when patient was on dobutamine      PROCEDURE AND FINDINGS:  Informed consent was given prior to the procedure and confirmed.  Intravenous prophylactic antibiotics were administered prior to the procedure. After the site of implantation was prepped and draped in the usual sterile fashion and after adequate anesthesia was given, the skin was infiltrated with local anesthetic.      ULTRA-SOUND-GUIDED ACCESS   Ultrasound was utilized to confirm axillary venous patency. Needle access was obtained under ultrasound guidance and a permanent recording obtained.  The left axillary vein was accessed via modified Seldinger technique under ultrasound guidance with a micropuncture needle. Three separate accesses were obtained with this technique.  J tip 0.035 inch guide wire/wires were introduced and their course throughout the venous system was confirmed by their presence under fluoroscopy in the inferior vena cava.       POCKET FORMATION   A micropuncture sheath was placed over the wire to protect the wire during electrocautery.  The skin was incised on the left chest with a #10 scalpel. Blunt and electrosurgical dissection was carried out to the level of the prepectoral fascia

## 2025-03-05 NOTE — PROGRESS NOTES
[facility]    Jeb Pereira  YOB: 1950          Assessment & Plan:     Oliguric --> nonoliguric ABRAHAM presumed secondary to ATN  S/p Bradycardic arrest  Lactic acidosis  Resp failure  S/p TAVR 2/18    Rec:  UOP excellent now. Cr stabilizing yesterday. Last HD yesterday  Line removed for pacer placement  No definite plans for HD. Plan to observe 1-2 days to see if additional HD needed       Subjective:   CC: ABRAHAM  HPI: HD yest sadaf well. HD cath out. For Pacer today  Current Facility-Administered Medications   Medication Dose Route Frequency    amiodarone (CORDARONE) tablet 400 mg  400 mg Oral BID    acetaminophen (TYLENOL) tablet 650 mg  650 mg Oral Q4H PRN    famotidine (PEPCID) tablet 20 mg  20 mg Oral Daily    glucose chewable tablet 16 g  4 tablet Oral PRN    dextrose bolus 10% 125 mL  125 mL IntraVENous PRN    Or    dextrose bolus 10% 250 mL  250 mL IntraVENous PRN    glucagon injection 1 mg  1 mg SubCUTAneous PRN    dextrose 10 % infusion   IntraVENous Continuous PRN    lidocaine 4 % external patch 1 patch  1 patch TransDERmal Daily    [Held by provider] aspirin chewable tablet 81 mg  81 mg Oral Daily    [Held by provider] clopidogrel (PLAVIX) tablet 75 mg  75 mg Oral Daily    lactulose (CHRONULAC) 10 GM/15ML solution 10 g  10 g Oral TID    sodium chloride flush 0.9 % injection 5-40 mL  5-40 mL IntraVENous 2 times per day    sodium chloride flush 0.9 % injection 5-40 mL  5-40 mL IntraVENous PRN    0.9 % sodium chloride infusion   IntraVENous PRN    potassium chloride 20 mEq/50 mL IVPB (Central Line)  20 mEq IntraVENous PRN    Or    potassium chloride 10 mEq/100 mL IVPB (Peripheral Line)  10 mEq IntraVENous PRN    magnesium sulfate 2000 mg in 50 mL IVPB premix  2,000 mg IntraVENous PRN    ondansetron (ZOFRAN-ODT) disintegrating tablet 4 mg  4 mg Oral Q8H PRN    Or    ondansetron (ZOFRAN) injection 4 mg  4 mg IntraVENous Q6H PRN     polyethylene glycol (GLYCOLAX) packet 17 g  17 g Oral Daily PRN    acetaminophen (TYLENOL) suppository 650 mg  650 mg Rectal Q6H PRN          Objective:     Vitals:  Blood pressure (!) 152/98, pulse 65, temperature 98 °F (36.7 °C), temperature source Oral, resp. rate 18, height 1.829 m (6'), weight 75.8 kg (167 lb), SpO2 97%.  Temp (24hrs), Av.9 °F (36.6 °C), Min:97.7 °F (36.5 °C), Max:98.4 °F (36.9 °C)      Intake and Output:   07 - 1900  In: -   Out: 300 [Urine:300]  1901 -  07  In: 1637.9 [I.V.:1137.9]  Out: 5550 [Urine:4050]    Physical Exam:               GENERAL ASSESSMENT: NAD  CHEST: Unlabored  HEART: reg  EXTREMITY: no EDEMA  NEURO: Grossly non focal          ECG/rhythm:    Data Review        Recent Labs     25  0308 25  0822 25  0024   * 132* 136   K 3.9 3.7 3.6   CL 96* 97 101   CO2 23 28 27   BUN 59* 64* 40*   CREATININE 4.13* 4.30* 2.79*   GLUCOSE 127* 139* 144*   CALCIUM 8.0* 8.4* 8.2*           : Evans Guzman MD  3/5/2025        [facility]:  [email or web address]  [email or web address]    [place] office:  [address]  [place] [address]  Phone: [phone]  Fax :     [phone]    [place] office:  [address] [place] [address]  Phone - [phone]  Fax - [phone]

## 2025-03-05 NOTE — PLAN OF CARE
Problem: Discharge Planning  Goal: Discharge to home or other facility with appropriate resources  Outcome: Progressing     Problem: Pain  Goal: Verbalizes/displays adequate comfort level or baseline comfort level  Outcome: Progressing     Problem: Safety - Adult  Goal: Free from fall injury  Outcome: Progressing     Problem: Skin/Tissue Integrity  Goal: Skin integrity remains intact  Description: 1.  Monitor for areas of redness and/or skin breakdown  2.  Assess vascular access sites hourly  3.  Every 4-6 hours minimum:  Change oxygen saturation probe site  4.  Every 4-6 hours:  If on nasal continuous positive airway pressure, respiratory therapy assess nares and determine need for appliance change or resting period  Outcome: Progressing     Problem: ABCDS Injury Assessment  Goal: Absence of physical injury  Outcome: Progressing     Problem: Chronic Conditions and Co-morbidities  Goal: Patient's chronic conditions and co-morbidity symptoms are monitored and maintained or improved  Outcome: Progressing

## 2025-03-05 NOTE — PROGRESS NOTES
[facility] CARDIOLOGY  Cardiology Care Note                  []Initial visit     [x]Established visit     Patient Name: Jeb Pereira - :1950 - MRN:[id number]  Primary Cardiologist: Dr. Gomez [facility]  Consulting Cardiologist: Anish Manuel MD     Reason for initial visit: cardiac arrest     HPI:    Patient is a 74-year-old male who is status post TAVR approximately 1 week ago at [facility] (Dr. Long).  Per family, patient has been doing well until this morning when daughter states that he had a fall.  Initially when she went to his side, he was able to talk but then passed out.  By the time 911 had been called and CPR/drugs were initiated.  ROSC was obtained.  Patient came to the ED.  Patient had to be resuscitated again in the ED-VT/V-fib also noted.  ROSC obtained.  Prolonged downtime (approximately 60 to 90 minutes).     As per Care Everywhere notes-patient had TAVR and had a trans venous temporary pacemaker for a day.  Per family-patient had heart catheterization prior to TAVR and was told that he did not have any significant blockages.  There is also mention of left bundle branch block in prior notes.    Now intubated/sedated with propofol.  Requiring epi gtt 2 mcg, Levophed gtt and Amio gtt 0.5 mg/min.  Per ER reports, pt had VT again while in ER.    Down time is approximated around 1 hr-1.5 hrs.    Head CT non-contrast, negative for acute process.  Pt has extensive bruising to left and back side of head.      In ER,  pt noted to have CHF with AV dissociation.      Pt is s/p TAVR 1 week at [facility] by Dr. Long/Dr. Gomez.  Per care everywhere, it was noted pt did develop LBBB intraprocedure and TVP was placed.   Per notes, pt did not require pacing and wire was removed POD1.       Wife reports LHC 2 weeks prior to TAVr, with reports of NO CAD.         25 - Extubated    Subjective:    Feeling better - denies CP or

## 2025-03-05 NOTE — PROGRESS NOTES
Brief EP note    Patient seen and examined today.    He has had excellent urine output overnight.    CBC this morning showed white cell count 13 however repeated and white cell count is actually 10.  No fevers.  HD line was removed yesterday.    Given low LVEF, cardiac arrest, high-grade AV block and suspected high pacing needs he will benefit from biventricular CRT-D    Regarding the LV lead, will consider left bundle branch area lead versus CS lead.  Will attempt to minimize contrast given ABRAHAM.    I discussed with the patient the risks, benefits and alternatives for Cardiac Resynchronization Therapy Defibrillator implantation.  I reported a risk of major complications at 2% and minor complications at 4%. The details of the procedure and risks associated with undergoing the procedure were discussed in detail including but not limited to, death, myocardial ischemia, stroke, cardiac perforation, potential need for temporary pacemaker implantation, lung damage requiring chest tube (pneumothorax), blood clot, blood collection requiring blood transfusion or repeat surgery (hematoma), infection, vascular injury or lead dislodgment risk. We discussed that the coronary sinus anatomy is not suitable for a CS LV lead in about 5% of patients and if this is the case we would not be able to place the lead. Shared decision making was performed. We reviewed an evidence-based ICD tool (https://patientdecisionaid.org/wp-content/uploads/2016/06/ICD-tool-shortened-V1-3-.pdf). All questions were answered to his satisfaction and he expressed verbal understanding of the procedure, the benefits, and risks. Jeb Randall wishes to proceed with the procedure.    -- Plan to proceed with left-sided CRT-D today and removal of right IJ temporary pacing lead

## 2025-03-06 LAB
ALBUMIN SERPL-MCNC: 2.6 G/DL (ref 3.5–5)
ALBUMIN/GLOB SERPL: 0.9 (ref 1.1–2.2)
ALP SERPL-CCNC: 111 U/L (ref 45–117)
ALT SERPL-CCNC: 341 U/L (ref 12–78)
ANION GAP SERPL CALC-SCNC: 8 MMOL/L (ref 2–12)
AST SERPL-CCNC: 66 U/L (ref 15–37)
BILIRUB SERPL-MCNC: 0.6 MG/DL (ref 0.2–1)
BUN SERPL-MCNC: 46 MG/DL (ref 6–20)
BUN/CREAT SERPL: 15 (ref 12–20)
CALCIUM SERPL-MCNC: 8.5 MG/DL (ref 8.5–10.1)
CHLORIDE SERPL-SCNC: 101 MMOL/L (ref 97–108)
CO2 SERPL-SCNC: 28 MMOL/L (ref 21–32)
CREAT SERPL-MCNC: 3.16 MG/DL (ref 0.7–1.3)
GLOBULIN SER CALC-MCNC: 2.9 G/DL (ref 2–4)
GLUCOSE SERPL-MCNC: 100 MG/DL (ref 65–100)
POTASSIUM SERPL-SCNC: 3.6 MMOL/L (ref 3.5–5.1)
PROT SERPL-MCNC: 5.5 G/DL (ref 6.4–8.2)
SODIUM SERPL-SCNC: 137 MMOL/L (ref 136–145)
TSH SERPL DL<=0.05 MIU/L-ACNC: 13.6 UIU/ML (ref 0.36–3.74)

## 2025-03-06 PROCEDURE — 97116 GAIT TRAINING THERAPY: CPT

## 2025-03-06 PROCEDURE — 94761 N-INVAS EAR/PLS OXIMETRY MLT: CPT

## 2025-03-06 PROCEDURE — 2500000003 HC RX 250 WO HCPCS: Performed by: HOSPITALIST

## 2025-03-06 PROCEDURE — 80053 COMPREHEN METABOLIC PANEL: CPT

## 2025-03-06 PROCEDURE — 6370000000 HC RX 637 (ALT 250 FOR IP): Performed by: NURSE PRACTITIONER

## 2025-03-06 PROCEDURE — 97530 THERAPEUTIC ACTIVITIES: CPT

## 2025-03-06 PROCEDURE — 1100000000 HC RM PRIVATE

## 2025-03-06 PROCEDURE — 97164 PT RE-EVAL EST PLAN CARE: CPT

## 2025-03-06 PROCEDURE — 97535 SELF CARE MNGMENT TRAINING: CPT

## 2025-03-06 PROCEDURE — 93005 ELECTROCARDIOGRAM TRACING: CPT | Performed by: HOSPITALIST

## 2025-03-06 PROCEDURE — 84443 ASSAY THYROID STIM HORMONE: CPT

## 2025-03-06 PROCEDURE — 6360000002 HC RX W HCPCS: Performed by: HOSPITALIST

## 2025-03-06 PROCEDURE — 6370000000 HC RX 637 (ALT 250 FOR IP): Performed by: INTERNAL MEDICINE

## 2025-03-06 PROCEDURE — 36415 COLL VENOUS BLD VENIPUNCTURE: CPT

## 2025-03-06 PROCEDURE — 2580000003 HC RX 258: Performed by: HOSPITALIST

## 2025-03-06 PROCEDURE — 99232 SBSQ HOSP IP/OBS MODERATE 35: CPT | Performed by: INTERNAL MEDICINE

## 2025-03-06 PROCEDURE — APPSS30 APP SPLIT SHARED TIME 16-30 MINUTES: Performed by: NURSE PRACTITIONER

## 2025-03-06 RX ORDER — CLOPIDOGREL BISULFATE 75 MG/1
75 TABLET ORAL DAILY
Status: DISCONTINUED | OUTPATIENT
Start: 2025-03-07 | End: 2025-03-08 | Stop reason: HOSPADM

## 2025-03-06 RX ORDER — AMIODARONE HYDROCHLORIDE 200 MG/1
200 TABLET ORAL DAILY
Status: DISCONTINUED | OUTPATIENT
Start: 2025-03-18 | End: 2025-03-06

## 2025-03-06 RX ORDER — CARVEDILOL 3.12 MG/1
3.12 TABLET ORAL 2 TIMES DAILY WITH MEALS
Status: DISCONTINUED | OUTPATIENT
Start: 2025-03-06 | End: 2025-03-07

## 2025-03-06 RX ORDER — AMIODARONE HYDROCHLORIDE 200 MG/1
200 TABLET ORAL DAILY
Status: DISCONTINUED | OUTPATIENT
Start: 2025-03-11 | End: 2025-03-08 | Stop reason: HOSPADM

## 2025-03-06 RX ADMIN — CARVEDILOL 3.12 MG: 3.12 TABLET, FILM COATED ORAL at 17:01

## 2025-03-06 RX ADMIN — ACETAMINOPHEN 650 MG: 325 TABLET ORAL at 04:29

## 2025-03-06 RX ADMIN — WATER 2000 MG: 1 INJECTION INTRAMUSCULAR; INTRAVENOUS; SUBCUTANEOUS at 00:05

## 2025-03-06 RX ADMIN — WATER 2000 MG: 1 INJECTION INTRAMUSCULAR; INTRAVENOUS; SUBCUTANEOUS at 12:46

## 2025-03-06 RX ADMIN — SODIUM CHLORIDE, PRESERVATIVE FREE 10 ML: 5 INJECTION INTRAVENOUS at 21:28

## 2025-03-06 RX ADMIN — CARVEDILOL 3.12 MG: 3.12 TABLET, FILM COATED ORAL at 12:45

## 2025-03-06 RX ADMIN — VANCOMYCIN HYDROCHLORIDE 1000 MG: 1 INJECTION, POWDER, LYOPHILIZED, FOR SOLUTION INTRAVENOUS at 14:20

## 2025-03-06 RX ADMIN — AMIODARONE HYDROCHLORIDE 400 MG: 200 TABLET ORAL at 22:15

## 2025-03-06 ASSESSMENT — PAIN DESCRIPTION - DESCRIPTORS: DESCRIPTORS: ACHING

## 2025-03-06 ASSESSMENT — PAIN SCALES - GENERAL
PAINLEVEL_OUTOF10: 2
PAINLEVEL_OUTOF10: 0

## 2025-03-06 ASSESSMENT — PAIN DESCRIPTION - ORIENTATION: ORIENTATION: LEFT

## 2025-03-06 NOTE — CARE COORDINATION
3/6/25  1:43 PM    Care Management Progress Note    Reason for Admission:   Cardiac arrest (HCC) [I46.9]  Procedure(s) (LRB):  Insert ICD multi (N/A)  Ultrasound guided vascular access (N/A)  1 Day Post-Op    Patient Admission Status: Inpatient  RUR: 13%  Hospitalization in the last 30 days (Readmission):  No        Transition of care plan:  Ongoing medical management  Nephrology- no definite plans for HD, observing next 1-2 days  Cardiology  Discharge plan:   Accepted by [facility] when medically stable  Discharge plan communicated with patient and/or discharge caregiver: Yes    Date 1st IMM letter given: 2/28/25  Outpatient follow-up.  Transport at discharge: Family    CM following for dc needs.     MALAIKA Dawson  [facility]      Available via [employer]

## 2025-03-06 NOTE — PROGRESS NOTES
[facility] CARDIOLOGY  Cardiology Care Note                  []Initial visit     [x]Established visit     Patient Name: Jeb Pereira - :1950 - MRN:[id number]  Primary Cardiologist: Dr. Gomez [facility]  Consulting Cardiologist: Anish Manuel MD     Reason for initial visit: cardiac arrest     HPI:    Patient is a 74-year-old male who is status post TAVR approximately 1 week ago at [facility] (Dr. Long).  Per family, patient has been doing well until this morning when daughter states that he had a fall.  Initially when she went to his side, he was able to talk but then passed out.  By the time 911 had been called and CPR/drugs were initiated.  ROSC was obtained.  Patient came to the ED.  Patient had to be resuscitated again in the ED-VT/V-fib also noted.  ROSC obtained.  Prolonged downtime (approximately 60 to 90 minutes).     As per Care Everywhere notes-patient had TAVR and had a trans venous temporary pacemaker for a day.  Per family-patient had heart catheterization prior to TAVR and was told that he did not have any significant blockages.  There is also mention of left bundle branch block in prior notes.    Now intubated/sedated with propofol.  Requiring epi gtt 2 mcg, Levophed gtt and Amio gtt 0.5 mg/min.  Per ER reports, pt had VT again while in ER.    Down time is approximated around 1 hr-1.5 hrs.    Head CT non-contrast, negative for acute process.  Pt has extensive bruising to left and back side of head.      In ER,  pt noted to have CHF with AV dissociation.      Pt is s/p TAVR 1 week at [facility] by Dr. Long/Dr. Gomez.  Per care everywhere, it was noted pt did develop LBBB intraprocedure and TVP was placed.   Per notes, pt did not require pacing and wire was removed POD1.       Wife reports LHC 2 weeks prior to TAVr, with reports of NO CAD.         25 - Extubated    Subjective:    Feeling better - denies CP or  MARKO Alvarado    glucagon injection 1 mg, 1 mg, SubCUTAneous, PRN, Jasmyne Smith PA    dextrose 10 % infusion, , IntraVENous, Continuous PRN, Jasmyne Smith PA    lidocaine 4 % external patch 1 patch, 1 patch, TransDERmal, Daily, Angeline Cavanaugh MD, 1 patch at 02/28/25 0801    clopidogrel (PLAVIX) tablet 75 mg, 75 mg, Oral, Daily, Susana Monique, APRN - NP, 75 mg at 03/04/25 0812    lactulose (CHRONULAC) 10 GM/15ML solution 10 g, 10 g, Oral, TID, Angeline Cavanaugh MD, 10 g at 03/01/25 1329    sodium chloride flush 0.9 % injection 5-40 mL, 5-40 mL, IntraVENous, 2 times per day, Alyssa Harrington MD, 10 mL at 03/05/25 2117    sodium chloride flush 0.9 % injection 5-40 mL, 5-40 mL, IntraVENous, PRN, Alyssa Harrington MD    0.9 % sodium chloride infusion, , IntraVENous, PRN, Alyssa Harrington MD    potassium chloride 20 mEq/50 mL IVPB (Central Line), 20 mEq, IntraVENous, PRN **OR** potassium chloride 10 mEq/100 mL IVPB (Peripheral Line), 10 mEq, IntraVENous, PRN, Alyssa Harrington MD    magnesium sulfate 2000 mg in 50 mL IVPB premix, 2,000 mg, IntraVENous, PRN, Alyssa Harrington MD    ondansetron (ZOFRAN-ODT) disintegrating tablet 4 mg, 4 mg, Oral, Q8H PRN **OR** ondansetron (ZOFRAN) injection 4 mg, 4 mg, IntraVENous, Q6H PRN, Alyssa Harrington MD, 4 mg at 02/28/25 0512    polyethylene glycol (GLYCOLAX) packet 17 g, 17 g, Oral, Daily PRN, Alyssa Harrington MD    [DISCONTINUED] acetaminophen (TYLENOL) tablet 650 mg, 650 mg, Oral, Q6H PRN **OR** acetaminophen (TYLENOL) suppository 650 mg, 650 mg, Rectal, Q6H PRN, Alyssa Harrington MD Susan Fisher, APRN - NP    [facility]  [place] center: (P) [phone]  (F) [phone]

## 2025-03-06 NOTE — PROGRESS NOTES
Patient returned to the room with pressure dressing to the right upper chest, no breakthrough drainage noted. Patient lethargic but able to answer questions.

## 2025-03-06 NOTE — PLAN OF CARE
Problem: Physical Therapy - Adult  Goal: By Discharge: Performs mobility at highest level of function for planned discharge setting.  See evaluation for individualized goals.  Description: FUNCTIONAL STATUS PRIOR TO ADMISSION: Patient was independent and active without use of DME.    HOME SUPPORT PRIOR TO ADMISSION: The patient lived with his very supportive wife but did not require assistance.    Physical Therapy Goals  Re-eval 3/6/25 s/p PPM  1.  Patient will move from supine to sit and sit to supine in bed with modified independence within 7 day(s).    2.  Patient will perform sit to stand with modified independence within 7 day(s).  3.  Patient will transfer from bed to chair and chair to bed with modified independence using the least restrictive device within 7 day(s).  4.  Patient will ambulate with modified independence for 150 feet with the least restrictive device within 7 day(s).   5:  Patient will state PPM precautions when prompted and follow during functional activity within 7 days.     Initiated 3/1/2025  1.  Patient will move from supine to sit and sit to supine in bed with supervision/set-up within 7 day(s).    2.  Patient will perform sit to stand with supervision/set-up within 7 day(s).  3.  Patient will transfer from bed to chair and chair to bed with supervision/set-up using the least restrictive device within 7 day(s).  4.  Patient will ambulate with supervision/set-up for 150 feet with the least restrictive device within 7 day(s).   5:  Patient will state PPM precautions when prompted and follow during functional activity within 7 days.     3/6/2025 1541 by Anette Lancaster, PT  Outcome: Progressing     PHYSICAL THERAPY RE-EVALUATION    Patient: Jeb Pereira (74 y.o. male)  Date: 3/6/2025  Primary Diagnosis: Cardiac arrest (HCC) [I46.9]  Procedure(s) (LRB):  Insert ICD multi (N/A)  Ultrasound guided vascular access (N/A) 1 Day Post-Op   Precautions:  discharging home with home health or need of SNF/IPR.    1. Mickie Wilson, Farrah Verdugo, Sage Taylor, Silvia Santos, Nathan Pulido, Deejay Wilson.  Validity of the AM-PAC “6-Clicks” Inpatient Daily Activity and Basic Mobility Short Forms. Physical Therapy Mar 2014, 94 3) [phone]; DOI: 10.2522/ptj.93527044  2. Earl MORROW, Clarissa DUCKWORTH, Davis DUCKWORTH, Shannan DUCKWORTH. Association of AM-PAC \"6-Clicks\" Basic Mobility and Daily Activity Scores With Discharge Destination. Phys Ther. 2021 Apr 4;101(4):veps587. doi: 10.1093/ptj/xelt240. PMID: 23295400.  3. Lazarus DUCKWORTH, Karen PACHECO, Anita S, Angelo K, Emily S. Activity Measure for Post-Acute Care \"6-Clicks\" Basic Mobility Scores Predict Discharge Destination After Acute Care Hospitalization in Select Patient Groups: A Retrospective, Observational Study. Arch Rehabil Res Clin Transl. 2022 Jul 16;4(3):797570. doi: 10.1016/j.arrct.2022.023900. PMID: 13875786; PMCID: [id number].  4. Steve JAMIL, Billie S, Rachid W, Jackie P. AM-PAC Short Forms Manual 4.0. Revised 2/2020.                                                                                                                                                                                                                               Pain Rating:  Did not interfere with session    Activity Tolerance:   Good and Fair     After treatment:   Patient left in no apparent distress in bed, Call bell within reach, Caregiver / family present, and Side rails x3, aware of need to call for assist and intention to comply    COMMUNICATION/EDUCATION:   The patient's plan of care was discussed with: occupational therapist, registered nurse, and certified nursing assistant/patient care technician    Patient Education  Education Given To: Patient;Family  Education Provided: Role of Therapy;Plan of Care;Orientation;Precautions;Home Exercise Program;Transfer Training;Mobility Training;Fall Prevention Strategies;Family Education  Education

## 2025-03-06 NOTE — PROGRESS NOTES
TRANSFER - OUT REPORT:    Verbal report given to DAVID Ho on Jeb Pereira  being transferred to [facility] for routine progression of patient care       Report consisted of patient's Situation, Background, Assessment and   Recommendations(SBAR).     Information from the following report(s) Nurse Handoff Report, Index, ED Encounter Summary, Adult Overview, Surgery Report, Intake/Output, MAR, Recent Results, Med Rec Status, Cardiac Rhythm Biventricular Paced, Neuro Assessment, and Event Log was reviewed with the receiving nurse.           Lines:   Peripheral IV 02/27/25 Distal;Left;Posterior Forearm (Active)   Site Assessment Clean, dry & intact 03/06/25 1200   Line Status Capped;Normal saline locked 03/06/25 1200   Line Care Connections checked and tightened 03/06/25 1200   Phlebitis Assessment No symptoms 03/06/25 1200   Infiltration Assessment 0 03/06/25 1200   Alcohol Cap Used Yes 03/06/25 1200   Dressing Status Clean, dry & intact 03/06/25 1200   Dressing Type Transparent 03/06/25 1200   Dressing Intervention New 02/28/25 1600       Extended Dwell Peripherial IV 02/27/25 Left Brachial (Active)   Criteria for Appropriate Use Irritant/vesicant medication 03/05/25 1500   Site Assessment Clean, dry & intact 03/06/25 1200   Phlebitis Assessment No symptoms 03/06/25 1200   Infiltration Assessment 0 03/06/25 1200   Line Status Capped;Normal saline locked 03/06/25 1200   Line Care Connections checked and tightened 03/06/25 1200   Alcohol Cap Used Yes 03/06/25 1200   Date of Last Dressing Change 02/27/25 03/06/25 1200   Dressing Type Transparent w/CHG gel 03/06/25 1200   Dressing Status Clean, dry & intact 03/06/25 1200   Dressing Intervention New 02/28/25 1600       Extended Dwell Peripherial IV 03/04/25 Right (Active)   Criteria for Appropriate Use Limited/no vessel suitable for conventional peripheral access 03/06/25 1200   Site Assessment Clean, dry & intact 03/06/25 1200   Phlebitis Assessment No symptoms 03/06/25  1200   Infiltration Assessment 0 03/06/25 1200   Line Status Capped;Normal saline locked 03/06/25 1200   Line Care Connections checked and tightened 03/06/25 1200   Alcohol Cap Used Yes 03/06/25 1200   Date of Last Dressing Change 03/04/25 03/06/25 1200   Dressing Type Transparent w/CHG gel;Securing device 03/06/25 1200   Dressing Status Clean, dry & intact 03/06/25 1200   Dressing Intervention New 03/04/25 1630        Opportunity for questions and clarification was provided.      Patient transported with:  Monitor and Registered Nurse

## 2025-03-06 NOTE — PLAN OF CARE
Problem: Occupational Therapy - Adult  Goal: By Discharge: Performs self-care activities at highest level of function for planned discharge setting.  See evaluation for individualized goals.  Description: FUNCTIONAL STATUS PRIOR TO ADMISSION:  The patient was independent and active at baseline.  HOME SUPPORT: Patient lived with his wife but didn't require assistance.    Occupational Therapy Goals:  Initiated 3/2/2025; Goals reviewed s/p removal of temporary pacemaker and insertion of PPM and new L UE PPM  1.  Patient will perform grooming with Supervision/set-up sitting EOB within 7 day(s). MET. Upgrade to standing at sink with MOD I 3/6.  2.  Patient will perform lower body dressing with Set-up and Supervision within 7 day(s). Continue as written 3/6.   3.  Patient will perform toilet transfers with Supervision  within 7 day(s). Continue as written 3/6.   4.  Patient will perform all aspects of toileting with Supervision within 7 day(s). Continue as written 3/6.   5.  Patient will participate in upper extremity therapeutic exercise/activities with Henry for 5 minutes within 7 day(s).  Continue 3/6.   6.  Patient will utilize energy conservation techniques during functional activities with verbal cues within 7 day(s). Continue 3/6.   7.  Patient will state PPM precautions when prompted and follow during functional activity within 7 days. Modify to include \"demonstrate\" precautions 3/6.   Outcome: Progressing     OCCUPATIONAL THERAPY TREATMENT: WEEKLY REASSESSMENT    Patient: Jeb Pereira (74 y.o. male)  Date: 3/6/2025  Primary Diagnosis: Cardiac arrest (HCC) [I46.9]  Procedure(s) (LRB):  Insert ICD multi (N/A)  Ultrasound guided vascular access (N/A) 1 Day Post-Op   Precautions: Fall Risk (pacemaker)                Chart, occupational therapy assessment, plan of care, and goals were reviewed.    ASSESSMENT  Patient continues to benefit from skilled OT services and is progressing towards goals. Patient

## 2025-03-06 NOTE — PLAN OF CARE
Problem: Safety - Adult  Goal: Free from fall injury  Outcome: Progressing     Problem: Discharge Planning  Goal: Discharge to home or other facility with appropriate resources  Outcome: Progressing     Problem: Pain  Goal: Verbalizes/displays adequate comfort level or baseline comfort level  Outcome: Progressing     Problem: Skin/Tissue Integrity  Goal: Skin integrity remains intact  Description: 1.  Monitor for areas of redness and/or skin breakdown  2.  Assess vascular access sites hourly  3.  Every 4-6 hours minimum:  Change oxygen saturation probe site  4.  Every 4-6 hours:  If on nasal continuous positive airway pressure, respiratory therapy assess nares and determine need for appliance change or resting period  Outcome: Progressing     Problem: ABCDS Injury Assessment  Goal: Absence of physical injury  Outcome: Progressing       Problem: Chronic Conditions and Co-morbidities  Goal: Patient's chronic conditions and co-morbidity symptoms are monitored and maintained or improved  Outcome: Progressing     Problem: Nutrition Deficit:  Goal: Optimize nutritional status  Outcome: Progressing

## 2025-03-06 NOTE — PROGRESS NOTES
Critical Care Progress Note  Sagar Thakur MD          Date of Service:  3/6/2025  NAME:  Jeb Pereira  :  1950  MRN:  [id number]      Subjective/Hospital course:        Patient had underwent TAVR at [facility]  1 wk PTA prior normal LHC and was discharged home on DAPT that he has been compliant with. Pt then passed out and had cardiac arrest-EMS started CPR and continued for almost an hour in field with shockable rhythm and suspicion of torsades. Patient was shocked multiple times and brought in ER where CPR continued and total CPR time around 80-90 mins.   EKG in ER is showing bradycardia with wide complex, junctional rhythm. Patient was started on Epi and Levophed added.   CT head with no bleeding. Patient was evaluated by cardiology and taken to cath lab for  temp pacemaker insertion. 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 Able to follow simple command. Epi at 3    - no acute events overnight. Started on dobutamine yesterday, EPI off.  Early this a.m had NSVT on 5 dobutamine, weaned down to 2.5 ubsequently dced  Started HD as there has been no significant improvement in UOP /cr   3/1: Patient had HD 1 L UF on , another liter on 3/1.  On amio and heparin since  for afib rvr  3/3: HD held  [facility] done:  Noted to have mid LAD 30 to 40% lesion.,  OM1 had 30% lesion.  Otherwise MLI rest of the coronaries.  3/4: Remains alert and oriented   Made>1L u/o. Creat around 4.1--->4.3 today  Plan is for HD session and then temp dialysis cath removed  3/5 : Had temp hd juan luis removed yday. Made 3 L urine  Heparin held from 4am. Going for permanent PCM insertion today and temp pcm removal  Denies any new symptoms. No cough or sob or chest pain  No abd pain  No other positive hx    3/6: Stable overnight.  Making good urine.  Creatinine mildly elevated.  Hemodynamics are stable.  Alert and oriented.  Working with physical  therapy.      Assessment/Plan:   # Sudden Cardiac arrest, at home. Unclear down time (mutiple different timelines noted in the history) when patient achieved ROSC and lost the pulse again. Multiple shocks given by EMS for shock-able rhythm VT/VF  # Cardiogenic shock, post cardiac arrest   # Intermittent bradycardia, post ROSC, on amiodarone with junctional rhythm on EKG   TTE reported as reduced LV function and EF 18%, grade 3 DD, concentric LVH and increased LV mass, highly suspicious for infiltrative CMP  Patient underwent TAVR last week at [facility] and has been on DAPT. Cath clean at that time  The cause of the cardiac arrest thought to be due to A-V dissociation due to recent TAVR +/- infiltrative cardiomyopathy      Plan  - TV pacer placed by cardiology  -Weaned off of dobutamine due to A-fib RVR, placed on amiodarone drip and heparin drip.  Continue Plavix likely can be d/tracy on plavix and eliquis after PCM insertion  -We are waiting for permanent pacemaker placement today      # Comatose state -postcardiac arrest, resolved.  Now alert oriented x 3.    # Shock liver  Transaminitis likely due to shock liver, improving      #Patient has a new onset A-fib this visit.  Dobutamine that he previously was on was held.  Amiodarone bolus and drip started. Changed to po today  On heparin.  Plan is to ultimately transition to DOAC when ok from cardio after PCM insertion.  Will consider holding aspirin if okay with cardiology.    # R groin bruising. There is a knot there possibly hematoma that is small.   US duplex on 3/1 showed  No evidence of pseudoaneurysm in the right groin. No evidence of AV fistula. Triphasic flow demonstrated in the right lower extremity arteries throughout. No evidence of thrombus in the right  common femoral vein and  proximal femoral and profunda veins. Pulsatile flow noted in the bilateral common femoral veins.        # ATN (oliguric)  Secondary to shock  - Resolving. HD catheter removed  -

## 2025-03-06 NOTE — H&P
[facility]  [address] [place] [address]  [phone]    [facility]ist [facility]    History & Physical    Date of service: 2/26/2025    Patient name: Jeb Pereira  MRN: [id number]  YOB: 1950  Age: 74 y.o.     Primary care provider:  Farrah Solis PA     Source of Information: patient, medical records                              Chief complain: Transfer from ICU    History of present illness  Jeb Pereira is a 74 y.o. male who was admitted on 2/26 with a cardiac arrest.  About a week PTA he had a TAVR at [facility].  Patient was given multiple rounds of CPR, medications and shocks and ROSC was eventually obtained.  Initial EKG in the ER showed bradycardia with a wide-complex junctional rhythm so patient was taken to the Cath Lab for temporary pacemaker insertion.  He was started on HD due to acute renal failure.  His temporary HD catheter is now removed and nephrology is following for continued renal recovery.  His ICU course was also complicated by A-fib with RVR and cardiogenic shock requiring epinephrine and dobutamine drips as well as respiratory failure requiring mechanical ventilation.  These acute issues are now resolved.  Patient underwent permanent pacemaker placement 3/5.    No past medical history on file.   Past Surgical History:   Procedure Laterality Date    CARDIAC PROCEDURE N/A 2/26/2025    Insert temporary pacemaker performed by Stuart Sena MD at [facility] CARDIAC CATH LAB    CARDIAC PROCEDURE N/A 3/3/2025    Coronary angiography performed by Anish Manuel MD at [facility] CARDIAC CATH LAB    INVASIVE VASCULAR N/A 2/26/2025    Ultrasound guided vascular access performed by Stuart Sena MD at [facility] CARDIAC CATH LAB    INVASIVE VASCULAR N/A 3/3/2025    Ultrasound guided vascular access performed by Anish Manuel MD at [facility] CARDIAC CATH LAB     Prior to Admission medications    Medication  suspected due to A-V dissociation due to recent TAVR  Intermittent bradycardia  Cardiogenic shock  Infiltrative cardiomyopathy  -Underwent temporary pacemaker 2/26 and then eventually permanent pacemaker placement 3/5  -Off all pressors now, was on epinephrine and dobutamine  -Echo 2/26 with reduced EF 18%, grade 2 diastolic dysfunction and increased LV mass highly suspicious for intraoperative cardiomyopathy.  Repeat 3/3 with EF 30 to 35%  -Patient had had a left heart cath prior to his TAVR repeat left heart cath 3/3 was relatively unchanged  -Continue low-dose Coreg.  Holding off on further GDMT due to renal dysfunction  -Continue Plavix    A-fib with RVR  -Occurred while on dobutamine so weaned off dobutamine drip and placed on amiodarone drip and heparin  -Now on p.o. amiodarone and Eliquis  -DC aspirin, continue Plavix    ABRAHAM/ATN  -Presumed secondary to ATN  -Was on temporary dialysis, no further dialysis planned per nephrology  -Urine output is picking up  -Continue to monitor for further recovery    Acute hypoxic respiratory failure  -Occurred postcardiac arrest  -Now off ventilator and on room air    Shock liver  -Improving, continue to monitor      Diet: Cardiac  Activity: As tolerated  DVT prophylaxis: Eliquis  Isolation precautions: None       Signed by: Alla Winkler MD    March 6, 2025 at 12:11 PM

## 2025-03-06 NOTE — PROGRESS NOTES
BRITTANIE NAYAK Formerly named [facility]    Jeb Pereira  YOB: 1950          Assessment & Plan:     Oliguric --> nonoliguric ABRAHAM presumed secondary to ATN  S/p Bradycardic arrest  Lactic acidosis  Resp failure  S/p TAVR 2/18    Rec:  Good uop. Cr up 2.7 -> 3.2 but had dialysis on Tuesday. Seemed to be stabilizing in the 4's prior to last HD.  Line removed for pacer placement  No definite plans for HD. Plan to observe 1-2 days to see if additional HD needed       Subjective:   CC: ABRAHAM  HPI: Pacer yesterday. Cr up 2.7 to 3.2. Good uop. No sob/n/v  Current Facility-Administered Medications   Medication Dose Route Frequency    ceFAZolin (ANCEF) 2,000 mg in sterile water 20 mL IV syringe  2,000 mg IntraVENous Q12H    vancomycin (VANCOCIN) 1,000 mg in sodium chloride 0.9 % 250 mL IVPB ([id number])  1,000 mg IntraVENous Once    amiodarone (CORDARONE) tablet 400 mg  400 mg Oral BID    acetaminophen (TYLENOL) tablet 650 mg  650 mg Oral Q4H PRN    famotidine (PEPCID) tablet 20 mg  20 mg Oral Daily    glucose chewable tablet 16 g  4 tablet Oral PRN    dextrose bolus 10% 125 mL  125 mL IntraVENous PRN    Or    dextrose bolus 10% 250 mL  250 mL IntraVENous PRN    glucagon injection 1 mg  1 mg SubCUTAneous PRN    dextrose 10 % infusion   IntraVENous Continuous PRN    lidocaine 4 % external patch 1 patch  1 patch TransDERmal Daily    [Held by provider] aspirin chewable tablet 81 mg  81 mg Oral Daily    [Held by provider] clopidogrel (PLAVIX) tablet 75 mg  75 mg Oral Daily    lactulose (CHRONULAC) 10 GM/15ML solution 10 g  10 g Oral TID    sodium chloride flush 0.9 % injection 5-40 mL  5-40 mL IntraVENous 2 times per day    sodium chloride flush 0.9 % injection 5-40 mL  5-40 mL IntraVENous PRN    0.9 % sodium chloride infusion   IntraVENous PRN    potassium chloride 20 mEq/50 mL IVPB (Central Line)  20 mEq IntraVENous PRN    Or    potassium chloride 10 mEq/100 mL IVPB (Peripheral Line)  10  mEq IntraVENous PRN    magnesium sulfate 2000 mg in 50 mL IVPB premix  2,000 mg IntraVENous PRN    ondansetron (ZOFRAN-ODT) disintegrating tablet 4 mg  4 mg Oral Q8H PRN    Or    ondansetron (ZOFRAN) injection 4 mg  4 mg IntraVENous Q6H PRN    polyethylene glycol (GLYCOLAX) packet 17 g  17 g Oral Daily PRN    acetaminophen (TYLENOL) suppository 650 mg  650 mg Rectal Q6H PRN          Objective:     Vitals:  Blood pressure (!) 144/90, pulse 74, temperature 98.3 °F (36.8 °C), temperature source Oral, resp. rate 24, height 1.829 m (6' 0.01\"), weight 75.8 kg (167 lb), SpO2 97%.  Temp (24hrs), Av.2 °F (36.8 °C), Min:97.8 °F (36.6 °C), Max:98.4 °F (36.9 °C)      Intake and Output:  No intake/output data recorded.   1901 -  0700  In: 334.2 [I.V.:334.2]  Out: 905 [Urine:875]    Physical Exam:               GENERAL ASSESSMENT: NAD  CHEST: Unlabored  HEART: reg  EXTREMITY: no EDEMA  NEURO: Grossly non focal          ECG/rhythm:    Data Review        Recent Labs     25  0822 25  0024 25  0418   * 136 137   K 3.7 3.6 3.6   CL 97 101 101   CO2 28 27 28   BUN 64* 40* 46*   CREATININE 4.30* 2.79* 3.16*   GLUCOSE 139* 144* 100   CALCIUM 8.4* 8.2* 8.5           : Evans Guzman MD  3/6/2025        [facility]:  [email or web address]  [email or web address]    [place] office:  [address] [place]  [place] [address]  Phone: [phone]  Fax :     [phone]    [place] office:  [address] [place] [address]  Phone - [phone]  Fax - [phone]

## 2025-03-07 PROBLEM — Z95.810 BIVENTRICULAR ICD (IMPLANTABLE CARDIOVERTER-DEFIBRILLATOR) IN PLACE: Status: ACTIVE | Noted: 2025-03-07

## 2025-03-07 LAB
ALBUMIN SERPL-MCNC: 2.5 G/DL (ref 3.5–5)
ALBUMIN/GLOB SERPL: 0.9 (ref 1.1–2.2)
ALP SERPL-CCNC: 104 U/L (ref 45–117)
ALT SERPL-CCNC: 114 U/L (ref 12–78)
ANION GAP SERPL CALC-SCNC: 9 MMOL/L (ref 2–12)
AST SERPL-CCNC: 46 U/L (ref 15–37)
BILIRUB DIRECT SERPL-MCNC: 0.2 MG/DL (ref 0–0.2)
BILIRUB SERPL-MCNC: 0.6 MG/DL (ref 0.2–1)
BUN SERPL-MCNC: 50 MG/DL (ref 6–20)
BUN/CREAT SERPL: 15 (ref 12–20)
CALCIUM SERPL-MCNC: 8.4 MG/DL (ref 8.5–10.1)
CHLORIDE SERPL-SCNC: 103 MMOL/L (ref 97–108)
CO2 SERPL-SCNC: 26 MMOL/L (ref 21–32)
CREAT SERPL-MCNC: 3.32 MG/DL (ref 0.7–1.3)
EKG ATRIAL RATE: 67 BPM
EKG DIAGNOSIS: NORMAL
EKG P AXIS: 46 DEGREES
EKG P-R INTERVAL: 146 MS
EKG Q-T INTERVAL: 470 MS
EKG QRS DURATION: 112 MS
EKG QTC CALCULATION (BAZETT): 496 MS
EKG R AXIS: 25 DEGREES
EKG T AXIS: -49 DEGREES
EKG VENTRICULAR RATE: 67 BPM
GLOBULIN SER CALC-MCNC: 2.9 G/DL (ref 2–4)
GLUCOSE SERPL-MCNC: 111 MG/DL (ref 65–100)
POTASSIUM SERPL-SCNC: 3.6 MMOL/L (ref 3.5–5.1)
PROT SERPL-MCNC: 5.4 G/DL (ref 6.4–8.2)
SODIUM SERPL-SCNC: 138 MMOL/L (ref 136–145)

## 2025-03-07 PROCEDURE — 80076 HEPATIC FUNCTION PANEL: CPT

## 2025-03-07 PROCEDURE — 86334 IMMUNOFIX E-PHORESIS SERUM: CPT

## 2025-03-07 PROCEDURE — 97530 THERAPEUTIC ACTIVITIES: CPT

## 2025-03-07 PROCEDURE — 36415 COLL VENOUS BLD VENIPUNCTURE: CPT

## 2025-03-07 PROCEDURE — 1100000000 HC RM PRIVATE

## 2025-03-07 PROCEDURE — 94761 N-INVAS EAR/PLS OXIMETRY MLT: CPT

## 2025-03-07 PROCEDURE — 80048 BASIC METABOLIC PNL TOTAL CA: CPT

## 2025-03-07 PROCEDURE — 97116 GAIT TRAINING THERAPY: CPT

## 2025-03-07 PROCEDURE — 97110 THERAPEUTIC EXERCISES: CPT

## 2025-03-07 PROCEDURE — 93010 ELECTROCARDIOGRAM REPORT: CPT | Performed by: SPECIALIST

## 2025-03-07 PROCEDURE — 82784 ASSAY IGA/IGD/IGG/IGM EACH: CPT

## 2025-03-07 PROCEDURE — 6370000000 HC RX 637 (ALT 250 FOR IP): Performed by: SPECIALIST

## 2025-03-07 PROCEDURE — 83521 IG LIGHT CHAINS FREE EACH: CPT

## 2025-03-07 PROCEDURE — 99232 SBSQ HOSP IP/OBS MODERATE 35: CPT | Performed by: INTERNAL MEDICINE

## 2025-03-07 PROCEDURE — APPSS30 APP SPLIT SHARED TIME 16-30 MINUTES: Performed by: NURSE PRACTITIONER

## 2025-03-07 PROCEDURE — 99233 SBSQ HOSP IP/OBS HIGH 50: CPT | Performed by: INTERNAL MEDICINE

## 2025-03-07 PROCEDURE — 2500000003 HC RX 250 WO HCPCS: Performed by: HOSPITALIST

## 2025-03-07 PROCEDURE — 97535 SELF CARE MNGMENT TRAINING: CPT

## 2025-03-07 PROCEDURE — 6370000000 HC RX 637 (ALT 250 FOR IP): Performed by: NURSE PRACTITIONER

## 2025-03-07 PROCEDURE — 6360000002 HC RX W HCPCS: Performed by: HOSPITALIST

## 2025-03-07 RX ORDER — CARVEDILOL 6.25 MG/1
6.25 TABLET ORAL 2 TIMES DAILY WITH MEALS
Status: DISCONTINUED | OUTPATIENT
Start: 2025-03-07 | End: 2025-03-08 | Stop reason: HOSPADM

## 2025-03-07 RX ADMIN — SODIUM CHLORIDE, PRESERVATIVE FREE 10 ML: 5 INJECTION INTRAVENOUS at 21:04

## 2025-03-07 RX ADMIN — SODIUM CHLORIDE, PRESERVATIVE FREE 10 ML: 5 INJECTION INTRAVENOUS at 08:20

## 2025-03-07 RX ADMIN — WATER 2000 MG: 1 INJECTION INTRAMUSCULAR; INTRAVENOUS; SUBCUTANEOUS at 01:31

## 2025-03-07 RX ADMIN — AMIODARONE HYDROCHLORIDE 400 MG: 200 TABLET ORAL at 21:03

## 2025-03-07 RX ADMIN — CARVEDILOL 6.25 MG: 6.25 TABLET, FILM COATED ORAL at 08:16

## 2025-03-07 RX ADMIN — AMIODARONE HYDROCHLORIDE 400 MG: 200 TABLET ORAL at 08:17

## 2025-03-07 RX ADMIN — WATER 2000 MG: 1 INJECTION INTRAMUSCULAR; INTRAVENOUS; SUBCUTANEOUS at 14:07

## 2025-03-07 RX ADMIN — CARVEDILOL 6.25 MG: 6.25 TABLET, FILM COATED ORAL at 18:03

## 2025-03-07 RX ADMIN — CLOPIDOGREL BISULFATE 75 MG: 75 TABLET ORAL at 08:17

## 2025-03-07 NOTE — PROGRESS NOTES
BRITTANIE NAYAK [facility]  [address] [place] [address] [phone]        Hospitalist Progress Note      NAME: Jeb Pereira   :  1950  MRM:  [id number]    Date/Time of service: 3/7/2025  1:37 PM       Subjective:   Patient was personally seen and examined by me during this time period.  Chart reviewed.     Denies any complaints today.  Feeling well.     ROS: per history above       Objective:       Vitals:       Last 24hrs VS reviewed since prior progress note. Most recent are:    Vitals:    25 1115   BP: 125/84   Pulse: 59   Resp: 18   Temp: 97.3 °F (36.3 °C)   SpO2: 98%     SpO2 Readings from Last 6 Encounters:   25 98%          Intake/Output Summary (Last 24 hours) at 3/7/2025 1337  Last data filed at 3/7/2025 1247  Gross per 24 hour   Intake 476 ml   Output --   Net 476 ml        Exam:     Physical Exam:    Gen:  Well-developed, well-nourished, in no acute distress.  Ecchymosis present over left eye.   HEENT:  Pink conjunctivae, hearing intact to voice, moist mucous membranes  Neck:  Supple  Resp:  No accessory muscle use, clear breath sounds without wheezes rales or rhonchi  Card:  No murmurs, normal S1, S2 without thrills, bruits or peripheral edema  Abd:  Soft, non-tender, non-distended  Skin:  No rashes or ulcers, skin turgor is good  Neuro:  Cranial nerves 3-12 are grossly intact, follows commands appropriately  Psych:  Good insight, oriented to person, place and time, alert      Medications Reviewed: (see below)    Lab Data Reviewed: (see below)    ______________________________________________________________________    Medications:     Current Facility-Administered Medications   Medication Dose Route Frequency    carvedilol (COREG) tablet 6.25 mg  6.25 mg Oral BID WC    [START ON 3/8/2025] apixaban (ELIQUIS) tablet 5 mg  5 mg Oral BID    [START ON 3/11/2025] amiodarone (CORDARONE) tablet 200 mg  200 mg Oral Daily    clopidogrel (PLAVIX) tablet 75

## 2025-03-07 NOTE — PROGRESS NOTES
BRITTANIE [facility] CARDIOLOGY  Cardiac Electrophysiology Note     []Initial Encounter     [x]Follow-up    Patient Name: Jeb Pereira - :1950 - MRN:[id number]  Primary Cardiologist: Dr. Gomez ([facility])  Consulting Cardiologist: Fanny Morgan MD, [facility], [facility]       Reason for encounter:   Cardiac Arrest      HPI:  Patient is a 74-year-old male who is status post TAVR approximately 1 week ago at [facility] (Dr. Long).  Per family, patient has been doing well until this morning when daughter states that he had a fall.  Initially when she went to his side, he was able to talk but then passed out.  By the time 911 had been called and CPR/drugs were initiated.  ROSC was obtained.  Patient came to the ED.  Patient had to be resuscitated again in the ED-VT/V-fib also noted.  ROSC obtained.  Prolonged downtime (approximately 60 to 90 minutes).     As per Care Everywhere notes-patient had TAVR and had a trans venous temporary pacemaker for a day.  Per family-patient had heart catheterization prior to TAVR and was told that he did not have any significant blockages.  There is also mention of left bundle branch block in prior notes.     Now intubated/sedated with propofol.  Requiring epi gtt 2 mcg, Levophed gtt and Amio gtt 0.5 mg/min.  Per ER reports, pt had VT again while in ER.    Down time is approximated around 1 hr-1.5 hrs.    Head CT non-contrast, negative for acute process.  Pt has extensive bruising to left and back side of head.       In ER,  pt noted to have CHF with AV dissociation.       Pt is s/p TAVR 1 week at [facility] by Dr. Long/Dr. Gomez.  Per care everywhere, it was noted pt did develop LBBB intraprocedure and TVP was placed.   Per notes, pt did not require pacing and wire was removed POD1.       Wife reports LHC 2 weeks prior to TAVr, with reports of NO CAD.           25 - Extubated    SUBJECTIVE:  No acute events, s/p LHC without any blockage. S/p

## 2025-03-07 NOTE — PROGRESS NOTES
BRITTANIE [facility] CARDIOLOGY  Cardiology Care Note                  []Initial visit     [x]Established visit     Patient Name: Jeb Pereira - :1950 - MRN:[id number]  Primary Cardiologist: Dr. Gomez [facility]  Consulting Cardiologist: Anish Manuel MD     Reason for initial visit: cardiac arrest     HPI:    Patient is a 74-year-old male who is status post TAVR approximately 1 week ago at [facility] (Dr. Long).  Per family, patient has been doing well until this morning when daughter states that he had a fall.  Initially when she went to his side, he was able to talk but then passed out.  By the time 911 had been called and CPR/drugs were initiated.  ROSC was obtained.  Patient came to the ED.  Patient had to be resuscitated again in the ED-VT/V-fib also noted.  ROSC obtained.  Prolonged downtime (approximately 60 to 90 minutes).     As per Care Everywhere notes-patient had TAVR and had a trans venous temporary pacemaker for a day.  Per family-patient had heart catheterization prior to TAVR and was told that he did not have any significant blockages.  There is also mention of left bundle branch block in prior notes.    Now intubated/sedated with propofol.  Requiring epi gtt 2 mcg, Levophed gtt and Amio gtt 0.5 mg/min.  Per ER reports, pt had VT again while in ER.    Down time is approximated around 1 hr-1.5 hrs.    Head CT non-contrast, negative for acute process.  Pt has extensive bruising to left and back side of head.      In ER,  pt noted to have CHF with AV dissociation.      Pt is s/p TAVR 1 week at [facility] by Dr. Long/Dr. Gomez.  Per care everywhere, it was noted pt did develop LBBB intraprocedure and TVP was placed.   Per notes, pt did not require pacing and wire was removed POD1.       Wife reports LHC 2 weeks prior to TAVr, with reports of NO CAD.         25 - Extubated    Subjective:    Cont to improve      Assessment  250 mL, 250 mL, IntraVENous, PRN, Jasmyne Smith PA    glucagon injection 1 mg, 1 mg, SubCUTAneous, PRN, Jasmyne Smith PA    dextrose 10 % infusion, , IntraVENous, Continuous PRN, Jasmyne Smith PA    lidocaine 4 % external patch 1 patch, 1 patch, TransDERmal, Daily, Angeline Cavanaugh MD, 1 patch at 02/28/25 0801    lactulose (CHRONULAC) 10 GM/15ML solution 10 g, 10 g, Oral, TID, Angeline Cavanaugh MD, 10 g at 03/01/25 1329    sodium chloride flush 0.9 % injection 5-40 mL, 5-40 mL, IntraVENous, 2 times per day, Alyssa Harrington MD, 10 mL at 03/07/25 0820    sodium chloride flush 0.9 % injection 5-40 mL, 5-40 mL, IntraVENous, PRN, Alyssa Harringtno MD    0.9 % sodium chloride infusion, , IntraVENous, PRN, Alyssa Harrington MD    potassium chloride 20 mEq/50 mL IVPB (Central Line), 20 mEq, IntraVENous, PRN **OR** potassium chloride 10 mEq/100 mL IVPB (Peripheral Line), 10 mEq, IntraVENous, PRN, Alyssa Harrington MD    magnesium sulfate 2000 mg in 50 mL IVPB premix, 2,000 mg, IntraVENous, PRN, Alyssa Harrington MD    ondansetron (ZOFRAN-ODT) disintegrating tablet 4 mg, 4 mg, Oral, Q8H PRN **OR** ondansetron (ZOFRAN) injection 4 mg, 4 mg, IntraVENous, Q6H PRN, Alyssa Harrington MD, 4 mg at 02/28/25 0512    polyethylene glycol (GLYCOLAX) packet 17 g, 17 g, Oral, Daily PRN, Alyssa Harrington MD    [DISCONTINUED] acetaminophen (TYLENOL) tablet 650 mg, 650 mg, Oral, Q6H PRN **OR** acetaminophen (TYLENOL) suppository 650 mg, 650 mg, Rectal, Q6H PRN, Alyssa Harrington MD Susan Fisher, APRN - NP    [facility] Cardiology  Call center: (P) [phone]  (F) [phone]

## 2025-03-07 NOTE — PLAN OF CARE
Problem: Physical Therapy - Adult  Goal: By Discharge: Performs mobility at highest level of function for planned discharge setting.  See evaluation for individualized goals.  Description: FUNCTIONAL STATUS PRIOR TO ADMISSION: Patient was independent and active without use of DME.    HOME SUPPORT PRIOR TO ADMISSION: The patient lived with his very supportive wife but did not require assistance.    Physical Therapy Goals  Re-eval 3/6/25 s/p PPM  1.  Patient will move from supine to sit and sit to supine in bed with modified independence within 7 day(s).    2.  Patient will perform sit to stand with modified independence within 7 day(s).  3.  Patient will transfer from bed to chair and chair to bed with modified independence using the least restrictive device within 7 day(s).  4.  Patient will ambulate with modified independence for 150 feet with the least restrictive device within 7 day(s).   5:  Patient will state PPM precautions when prompted and follow during functional activity within 7 days.     Initiated 3/1/2025  1.  Patient will move from supine to sit and sit to supine in bed with supervision/set-up within 7 day(s).    2.  Patient will perform sit to stand with supervision/set-up within 7 day(s).  3.  Patient will transfer from bed to chair and chair to bed with supervision/set-up using the least restrictive device within 7 day(s).  4.  Patient will ambulate with supervision/set-up for 150 feet with the least restrictive device within 7 day(s).   5:  Patient will state PPM precautions when prompted and follow during functional activity within 7 days.     Outcome: Progressing     PHYSICAL THERAPY TREATMENT    Patient: Jeb Pereira (74 y.o. male)  Date: 3/7/2025  Diagnosis: Cardiac arrest (HCC) [I46.9] Cardiac arrest (HCC)  Procedure(s) (LRB):  Insert ICD multi (N/A)  Ultrasound guided vascular access (N/A) 2 Days Post-Op  Precautions: Restrictions/Precautions  Restrictions/Precautions: Fall Risk

## 2025-03-07 NOTE — PLAN OF CARE
Problem: Occupational Therapy - Adult  Goal: By Discharge: Performs self-care activities at highest level of function for planned discharge setting.  See evaluation for individualized goals.  Description: FUNCTIONAL STATUS PRIOR TO ADMISSION:  The patient was independent and active at baseline.  HOME SUPPORT: Patient lived with his wife but didn't require assistance.    Occupational Therapy Goals:  Initiated 3/2/2025; Goals reviewed s/p removal of temporary pacemaker and insertion of PPM and new L UE PPM  1.  Patient will perform grooming with Supervision/set-up sitting EOB within 7 day(s). MET. Upgrade to standing at sink with MOD I 3/6.  2.  Patient will perform lower body dressing with Set-up and Supervision within 7 day(s). Continue as written 3/6.   3.  Patient will perform toilet transfers with Supervision  within 7 day(s). Continue as written 3/6.   4.  Patient will perform all aspects of toileting with Supervision within 7 day(s). Continue as written 3/6.   5.  Patient will participate in upper extremity therapeutic exercise/activities with [place] for 5 minutes within 7 day(s).  Continue 3/6.   6.  Patient will utilize energy conservation techniques during functional activities with verbal cues within 7 day(s). Continue 3/6.   7.  Patient will state PPM precautions when prompted and follow during functional activity within 7 days. Modify to include \"demonstrate\" precautions 3/6.   Outcome: Completed      OCCUPATIONAL THERAPY TREATMENT/DISCHARGE  Patient: Jeb Pereira (74 y.o. male)  Date: 3/7/2025  Primary Diagnosis: Cardiac arrest (HCC) [I46.9]  Procedure(s) (LRB):  Insert ICD multi (N/A)  Ultrasound guided vascular access (N/A) 2 Days Post-Op   Precautions: Fall Risk (pacemaker)                  Chart, occupational therapy assessment, plan of care, and goals were reviewed.    ASSESSMENT  Patient continues with skilled OT services and has progressed towards goals.  Patient today is received up  Factors: able to cross legs to reach to distal LB to don socks and shoes; able to stand and manage clothing over hips    Toileting: Supervision;Modified independent   Toileting Skilled Clinical Factors: in bathroom    Pain Rating:  Pt reporting mild soreness in L UE  Pain Intervention(s):   rest, repositioning, and pain is at a level acceptable to the patient    Activity Tolerance:   WNL  Please refer to the flowsheet for vital signs taken during this treatment.    After treatment:   Patient left in no apparent distress sitting up in chair, Call bell within reach, and Caregiver / family present    COMMUNICATION/EDUCATION:   The patient's plan of care was discussed with: physical therapist and registered nurse    Patient Education  Education Given To: Patient  Education Provided: Precautions;ADL Adaptive Strategies;Energy Conservation;Home Exercise Program  Education Method: Verbal;Printed Information/Hand-outs  Barriers to Learning: None  Education Outcome: Verbalized understanding    Thank you for this referral.  Alia Thomas OT  Minutes: 25

## 2025-03-07 NOTE — PROGRESS NOTES
BRITTANIE NAYAK [facility]    Jeb Pereira  YOB: 1950          Assessment & Plan:     Oliguric --> nonoliguric ABRAHAM presumed secondary to ATN  S/p Bradycardic arrest  Lactic acidosis  Resp failure  S/p TAVR 2/18    Rec:  Good uop. Cr up 2.7 -> 3.2 but had dialysis on Tuesday. Seemed to be stabilizing in the 4's prior to last HD.  Line removed for pacer placement  Okay to DC home tomorrow  Follow-up with us in 1 to 2 weeks  Plan is discussed with patient and daughter at bedside       Subjective:   CC: ABRAHAM  HPI: Cr 3.2 Good uop. No sob/n/v  Current Facility-Administered Medications   Medication Dose Route Frequency    carvedilol (COREG) tablet 6.25 mg  6.25 mg Oral BID WC    [START ON 3/8/2025] apixaban (ELIQUIS) tablet 5 mg  5 mg Oral BID    [START ON 3/11/2025] amiodarone (CORDARONE) tablet 200 mg  200 mg Oral Daily    clopidogrel (PLAVIX) tablet 75 mg  75 mg Oral Daily    ceFAZolin (ANCEF) 2,000 mg in sterile water 20 mL IV syringe  2,000 mg IntraVENous Q12H    amiodarone (CORDARONE) tablet 400 mg  400 mg Oral BID    acetaminophen (TYLENOL) tablet 650 mg  650 mg Oral Q4H PRN    famotidine (PEPCID) tablet 20 mg  20 mg Oral Daily    glucose chewable tablet 16 g  4 tablet Oral PRN    dextrose bolus 10% 125 mL  125 mL IntraVENous PRN    Or    dextrose bolus 10% 250 mL  250 mL IntraVENous PRN    glucagon injection 1 mg  1 mg SubCUTAneous PRN    dextrose 10 % infusion   IntraVENous Continuous PRN    lidocaine 4 % external patch 1 patch  1 patch TransDERmal Daily    lactulose (CHRONULAC) 10 GM/15ML solution 10 g  10 g Oral TID    sodium chloride flush 0.9 % injection 5-40 mL  5-40 mL IntraVENous 2 times per day    sodium chloride flush 0.9 % injection 5-40 mL  5-40 mL IntraVENous PRN    0.9 % sodium chloride infusion   IntraVENous PRN    potassium chloride 20 mEq/50 mL IVPB (Central Line)  20 mEq IntraVENous PRN    Or    potassium chloride 10 mEq/100 mL IVPB  (Peripheral Line)  10 mEq IntraVENous PRN    magnesium sulfate 2000 mg in 50 mL IVPB premix  2,000 mg IntraVENous PRN    ondansetron (ZOFRAN-ODT) disintegrating tablet 4 mg  4 mg Oral Q8H PRN    Or    ondansetron (ZOFRAN) injection 4 mg  4 mg IntraVENous Q6H PRN    polyethylene glycol (GLYCOLAX) packet 17 g  17 g Oral Daily PRN    acetaminophen (TYLENOL) suppository 650 mg  650 mg Rectal Q6H PRN          Objective:     Vitals:  Blood pressure 125/84, pulse 59, temperature 97.3 °F (36.3 °C), temperature source Oral, resp. rate 18, height 1.829 m (6' 0.01\"), weight 69.7 kg (153 lb 10.6 oz), SpO2 98%.  Temp (24hrs), Av.2 °F (36.8 °C), Min:97.3 °F (36.3 °C), Max:98.6 °F (37 °C)      Intake and Output:  701 - 1900  In: 236 [P.O.:236]  Out: -    190 -  07  In: -   Out: 575 [Urine:575]    Physical Exam:               GENERAL ASSESSMENT: NAD  CHEST: Unlabored  HEART: reg  EXTREMITY: no EDEMA  NEURO: Grossly non focal          ECG/rhythm:    Data Review        Recent Labs     25  0024 25  0418 25  0523    137 138   K 3.6 3.6 3.6    101 103   CO2 27 28 26   BUN 40* 46* 50*   CREATININE 2.79* 3.16* 3.32*   GLUCOSE 144* 100 111*   CALCIUM 8.2* 8.5 8.4*           : Ligia Merlos MD  3/7/2025        [facility]:  [email or web address]  [email or web address]    [place] office:  [address]  [place] [address]  Phone: [phone]  Fax :     [phone]    [place] office:  [address] [place] [address]  Phone - [phone]  Fax - [phone]

## 2025-03-07 NOTE — CARE COORDINATION
Care Management Progress Note    Reason for Admission:   Cardiac arrest (HCC) [I46.9]  Procedure(s) (LRB):  Insert ICD multi (N/A)  Ultrasound guided vascular access (N/A)  2 Days Post-Op    Patient Admission Status: Inpatient  RUR: 13%  Hospitalization in the last 30 days (Readmission):  No        Transition of care plan:  Clinical Updates:  Ongoing medical management.  Pending Nephro clearance.  IV abx per IDR.     Discharge Plan:    Patient has been cleared by PT/OT (based on their eval today).     Date last IMM letter given: 3/5/2025    Outpatient follow-up:  TBD    Transport at discharge: Family    Please reach out to  if any DC needs arise.    ______________________________________  CHARMAINE Brock, RN-CM  [facility]- Care Management  Available via [employer]  3/7/2025  3:34 PM

## 2025-03-07 NOTE — PLAN OF CARE
Problem: Safety - Adult  Goal: Free from fall injury  Outcome: Progressing     Problem: Discharge Planning  Goal: Discharge to home or other facility with appropriate resources  Outcome: Progressing     Problem: Pain  Goal: Verbalizes/displays adequate comfort level or baseline comfort level  Outcome: Progressing     Problem: Skin/Tissue Integrity  Goal: Skin integrity remains intact  Description: 1.  Monitor for areas of redness and/or skin breakdown  2.  Assess vascular access sites hourly  3.  Every 4-6 hours minimum:  Change oxygen saturation probe site  4.  Every 4-6 hours:  If on nasal continuous positive airway pressure, respiratory therapy assess nares and determine need for appliance change or resting period  Outcome: Progressing  Flowsheets (Taken 3/6/2025 2200)  Skin Integrity Remains Intact:   Assess vascular access sites hourly   Monitor for areas of redness and/or skin breakdown   Every 4-6 hours minimum: Change oxygen saturation probe site     Problem: ABCDS Injury Assessment  Goal: Absence of physical injury  Outcome: Progressing     Problem: Physical Therapy - Adult  Goal: By Discharge: Performs mobility at highest level of function for planned discharge setting.  See evaluation for individualized goals.  Description: FUNCTIONAL STATUS PRIOR TO ADMISSION: Patient was independent and active without use of DME.    HOME SUPPORT PRIOR TO ADMISSION: The patient lived with his very supportive wife but did not require assistance.    Physical Therapy Goals  Re-eval 3/6/25 s/p PPM  1.  Patient will move from supine to sit and sit to supine in bed with modified independence within 7 day(s).    2.  Patient will perform sit to stand with modified independence within 7 day(s).  3.  Patient will transfer from bed to chair and chair to bed with modified independence using the least restrictive device within 7 day(s).  4.  Patient will ambulate with modified independence for 150 feet with the least restrictive  device within 7 day(s).   5:  Patient will state PPM precautions when prompted and follow during functional activity within 7 days.     Initiated 3/1/2025  1.  Patient will move from supine to sit and sit to supine in bed with supervision/set-up within 7 day(s).    2.  Patient will perform sit to stand with supervision/set-up within 7 day(s).  3.  Patient will transfer from bed to chair and chair to bed with supervision/set-up using the least restrictive device within 7 day(s).  4.  Patient will ambulate with supervision/set-up for 150 feet with the least restrictive device within 7 day(s).   5:  Patient will state PPM precautions when prompted and follow during functional activity within 7 days.     3/6/2025 1541 by Anette Lancaster, PT  Outcome: Progressing  3/6/2025 1539 by Anette Lancaster, PT  Outcome: Progressing     Problem: Chronic Conditions and Co-morbidities  Goal: Patient's chronic conditions and co-morbidity symptoms are monitored and maintained or improved  Outcome: Progressing     Problem: Nutrition Deficit:  Goal: Optimize nutritional status  Outcome: Progressing

## 2025-03-08 VITALS
HEART RATE: 64 BPM | BODY MASS INDEX: 20.81 KG/M2 | RESPIRATION RATE: 18 BRPM | TEMPERATURE: 98.2 F | OXYGEN SATURATION: 99 % | WEIGHT: 153.66 LBS | HEIGHT: 72 IN | SYSTOLIC BLOOD PRESSURE: 139 MMHG | DIASTOLIC BLOOD PRESSURE: 91 MMHG

## 2025-03-08 LAB
ALBUMIN SERPL-MCNC: 2.5 G/DL (ref 3.5–5)
ALBUMIN/GLOB SERPL: 0.9 (ref 1.1–2.2)
ALP SERPL-CCNC: 104 U/L (ref 45–117)
ALT SERPL-CCNC: 51 U/L (ref 12–78)
ANION GAP SERPL CALC-SCNC: 6 MMOL/L (ref 2–12)
AST SERPL-CCNC: 39 U/L (ref 15–37)
BASOPHILS # BLD: 0.05 K/UL (ref 0–0.1)
BASOPHILS NFR BLD: 0.4 % (ref 0–1)
BILIRUB SERPL-MCNC: 0.7 MG/DL (ref 0.2–1)
BUN SERPL-MCNC: 53 MG/DL (ref 6–20)
BUN/CREAT SERPL: 18 (ref 12–20)
CALCIUM SERPL-MCNC: 8.2 MG/DL (ref 8.5–10.1)
CHLORIDE SERPL-SCNC: 103 MMOL/L (ref 97–108)
CO2 SERPL-SCNC: 27 MMOL/L (ref 21–32)
CREAT SERPL-MCNC: 3.01 MG/DL (ref 0.7–1.3)
DIFFERENTIAL METHOD BLD: ABNORMAL
EOSINOPHIL # BLD: 0.36 K/UL (ref 0–0.4)
EOSINOPHIL NFR BLD: 3 % (ref 0–7)
ERYTHROCYTE [DISTWIDTH] IN BLOOD BY AUTOMATED COUNT: 12.4 % (ref 11.5–14.5)
GLOBULIN SER CALC-MCNC: 2.9 G/DL (ref 2–4)
GLUCOSE SERPL-MCNC: 111 MG/DL (ref 65–100)
HCT VFR BLD AUTO: 33.9 % (ref 36.6–50.3)
HGB BLD-MCNC: 11.5 G/DL (ref 12.1–17)
IMM GRANULOCYTES # BLD AUTO: 0.09 K/UL (ref 0–0.04)
IMM GRANULOCYTES NFR BLD AUTO: 0.7 % (ref 0–0.5)
LYMPHOCYTES # BLD: 1.19 K/UL (ref 0.8–3.5)
LYMPHOCYTES NFR BLD: 9.8 % (ref 12–49)
MCH RBC QN AUTO: 31.9 PG (ref 26–34)
MCHC RBC AUTO-ENTMCNC: 33.9 G/DL (ref 30–36.5)
MCV RBC AUTO: 93.9 FL (ref 80–99)
MONOCYTES # BLD: 1.68 K/UL (ref 0–1)
MONOCYTES NFR BLD: 13.8 % (ref 5–13)
NEUTS SEG # BLD: 8.76 K/UL (ref 1.8–8)
NEUTS SEG NFR BLD: 72.3 % (ref 32–75)
NRBC # BLD: 0 K/UL (ref 0–0.01)
NRBC BLD-RTO: 0 PER 100 WBC
PLATELET # BLD AUTO: 233 K/UL (ref 150–400)
PMV BLD AUTO: 9.5 FL (ref 8.9–12.9)
POTASSIUM SERPL-SCNC: 3.5 MMOL/L (ref 3.5–5.1)
PROT SERPL-MCNC: 5.4 G/DL (ref 6.4–8.2)
RBC # BLD AUTO: 3.61 M/UL (ref 4.1–5.7)
SODIUM SERPL-SCNC: 136 MMOL/L (ref 136–145)
WBC # BLD AUTO: 12.1 K/UL (ref 4.1–11.1)

## 2025-03-08 PROCEDURE — 2500000003 HC RX 250 WO HCPCS: Performed by: HOSPITALIST

## 2025-03-08 PROCEDURE — 85025 COMPLETE CBC W/AUTO DIFF WBC: CPT

## 2025-03-08 PROCEDURE — 6370000000 HC RX 637 (ALT 250 FOR IP): Performed by: SPECIALIST

## 2025-03-08 PROCEDURE — 80053 COMPREHEN METABOLIC PANEL: CPT

## 2025-03-08 PROCEDURE — 94761 N-INVAS EAR/PLS OXIMETRY MLT: CPT

## 2025-03-08 PROCEDURE — 36415 COLL VENOUS BLD VENIPUNCTURE: CPT

## 2025-03-08 PROCEDURE — 99232 SBSQ HOSP IP/OBS MODERATE 35: CPT | Performed by: STUDENT IN AN ORGANIZED HEALTH CARE EDUCATION/TRAINING PROGRAM

## 2025-03-08 PROCEDURE — 6370000000 HC RX 637 (ALT 250 FOR IP): Performed by: NURSE PRACTITIONER

## 2025-03-08 PROCEDURE — 6370000000 HC RX 637 (ALT 250 FOR IP): Performed by: INTERNAL MEDICINE

## 2025-03-08 RX ORDER — CARVEDILOL 6.25 MG/1
6.25 TABLET ORAL 2 TIMES DAILY WITH MEALS
Qty: 60 TABLET | Refills: 3 | Status: SHIPPED | OUTPATIENT
Start: 2025-03-08

## 2025-03-08 RX ORDER — AMIODARONE HYDROCHLORIDE 200 MG/1
200 TABLET ORAL DAILY
Qty: 90 TABLET | Refills: 0 | Status: SHIPPED | OUTPATIENT
Start: 2025-03-11

## 2025-03-08 RX ORDER — AMIODARONE HYDROCHLORIDE 400 MG/1
400 TABLET ORAL 2 TIMES DAILY
Qty: 5 TABLET | Refills: 0 | Status: SHIPPED | OUTPATIENT
Start: 2025-03-08 | End: 2025-03-11

## 2025-03-08 RX ORDER — FUROSEMIDE 40 MG/1
40 TABLET ORAL DAILY PRN
Qty: 60 TABLET | Refills: 0 | Status: SHIPPED | OUTPATIENT
Start: 2025-03-08

## 2025-03-08 RX ADMIN — APIXABAN 5 MG: 5 TABLET, FILM COATED ORAL at 09:22

## 2025-03-08 RX ADMIN — SODIUM CHLORIDE, PRESERVATIVE FREE 10 ML: 5 INJECTION INTRAVENOUS at 09:24

## 2025-03-08 RX ADMIN — CLOPIDOGREL BISULFATE 75 MG: 75 TABLET ORAL at 09:22

## 2025-03-08 RX ADMIN — AMIODARONE HYDROCHLORIDE 400 MG: 200 TABLET ORAL at 09:21

## 2025-03-08 RX ADMIN — CARVEDILOL 6.25 MG: 6.25 TABLET, FILM COATED ORAL at 09:22

## 2025-03-08 NOTE — CARE COORDINATION
Case Management Discharge Note    Discharge Plan:  Patient discharging to home today with no CM needs identified.  Patient has been cleared by PT/OT.    Transportation at DC:  Family    Please reach out to CM if any DC needs arise.    ______________________________________  CHARMAINE Brock, RN-CM  [facility]- Care Management  Available via Snap[employer]  3/8/2025  12:06 PM

## 2025-03-08 NOTE — DISCHARGE SUMMARY
Patient ID:  Jeb Pereira  [id number]  74 y.o.  1950    Admit date: 2/26/2025    Discharge date and time: 3/8/2025    Admission Diagnoses: Cardiac arrest (HCC) [I46.9]    Discharge Diagnoses:    Principal Problem:    Cardiac arrest (HCC)  Active Problems:    Cardiomyopathy (HCC)    ATN (acute tubular necrosis)    History of transcatheter aortic valve replacement (TAVR)    Bradycardia    High-grade atrioventricular block    Left bundle branch block    Acute respiratory failure with hypoxia (HCC)    Sinus bradycardia    Chronic systolic congestive heart failure (HCC)    Biventricular ICD (implantable cardioverter-defibrillator) in place  Resolved Problems:    * No resolved hospital problems. *       RECOMMENDATIONS FOR PCP:   Please follow up on elevated TSH.   Please ensure follow up with cardiology, EP, and nephrology.     Admission HPI:  History is limited, obtained from wife/ daughter at bedside and EMR review.   Patient had underwent TAVR at [facility] last week with prior normal LHC and was discharged home on DAPT that he has been compliant to. Wife reports no other medication and patient has been doing fine until this AM when she heard her  falling and found him on the ground. She called 911. Shortly before EMS arrived, patient became unresponsive, pulseless and cyanosed. EMS started CPR and continued for almost an hour in field with shockable rhythm and suspicion of torsades. Patient was shocked multiple times and brought in ER where CPR continued and total CPR time around 80-90 mins.   EKG in ER is showing bradycardia with wide complex, junctional rhythm. Patient was started on Epi and Levophed added.   CT head with no bleeding. ICU service was called for ICU admission.   On my initial evaluation in ER, patient was on 20 mcg of levophed and 4 mcg of Epi with amiodarone eat rate of 1 mg per minute.   Patient on no sedation, not responsive but breathing over the vent.   Stat echo and  directed    Follow-up with Farrah Solis PA within one week unless specified earlier.     Approximate time spent in patient care on day of discharge: 36 min    Signed:  Brendon Mcnamara MD  3/8/2025  11:12 AM

## 2025-03-08 NOTE — PROGRESS NOTES
[facility] CARDIOLOGY  Cardiology Care Note                  []Initial visit     [x]Established visit     Patient Name: Jeb Pereira - :1950 - MRN:[id number]  Primary Cardiologist: Dr. Gomez [facility]  Consulting Cardiologist:      Reason for initial visit: cardiac arrest     HPI:    Patient is a 74-year-old male who is status post TAVR approximately 1 week ago at [facility] (Dr. Long).  Per family, patient has been doing well until this morning when daughter states that he had a fall.  Initially when she went to his side, he was able to talk but then passed out.  By the time 911 had been called and CPR/drugs were initiated.  ROSC was obtained.  Patient came to the ED.  Patient had to be resuscitated again in the ED-VT/V-fib also noted.  ROSC obtained.  Prolonged downtime (approximately 60 to 90 minutes).     As per Care Everywhere notes-patient had TAVR and had a trans venous temporary pacemaker for a day.  Per family-patient had heart catheterization prior to TAVR and was told that he did not have any significant blockages.  There is also mention of left bundle branch block in prior notes.    Now intubated/sedated with propofol.  Requiring epi gtt 2 mcg, Levophed gtt and Amio gtt 0.5 mg/min.  Per ER reports, pt had VT again while in ER.    Down time is approximated around 1 hr-1.5 hrs.    Head CT non-contrast, negative for acute process.  Pt has extensive bruising to left and back side of head.      In ER,  pt noted to have CHF with AV dissociation.      Pt is s/p TAVR 1 week at [facility] by Dr. Long/Dr. Gomez.  Per care everywhere, it was noted pt did develop LBBB intraprocedure and TVP was placed.   Per notes, pt did not require pacing and wire was removed POD1.       Wife reports LHC 2 weeks prior to TAVr, with reports of NO CAD.         25 - Extubated    Subjective:    Cont to improve      Assessment and Plan     Cardiac

## 2025-03-09 LAB
IGA SERPL-MCNC: 212 MG/DL (ref 61–437)
IGG SERPL-MCNC: 817 MG/DL (ref 603–1613)
IGM SERPL-MCNC: 67 MG/DL (ref 15–143)
PROT PATTERN SERPL IFE-IMP: NORMAL

## 2025-03-13 LAB
IGA SERPL-MCNC: 212 MG/DL (ref 61–437)
IGG SERPL-MCNC: 817 MG/DL (ref 603–1613)
IGM SERPL-MCNC: 67 MG/DL (ref 15–143)
KAPPA LC FREE SER-MCNC: 38.5 MG/L (ref 3.3–19.4)
KAPPA LC FREE/LAMBDA FREE SER: 1.7 (ref 0.26–1.65)
LAMBDA LC FREE SERPL-MCNC: 22.6 MG/L (ref 5.7–26.3)
PROT PATTERN SERPL IFE-IMP: NORMAL

## 2025-03-18 ENCOUNTER — OFFICE VISIT (OUTPATIENT)
Age: 75
End: 2025-03-18
Payer: MEDICARE

## 2025-03-18 ENCOUNTER — PROCEDURE VISIT (OUTPATIENT)
Age: 75
End: 2025-03-18
Payer: MEDICARE

## 2025-03-18 VITALS
DIASTOLIC BLOOD PRESSURE: 80 MMHG | HEART RATE: 66 BPM | WEIGHT: 148 LBS | HEIGHT: 72 IN | BODY MASS INDEX: 20.05 KG/M2 | OXYGEN SATURATION: 94 % | RESPIRATION RATE: 18 BRPM | SYSTOLIC BLOOD PRESSURE: 134 MMHG

## 2025-03-18 DIAGNOSIS — I42.9 CARDIOMYOPATHY, UNSPECIFIED TYPE (HCC): ICD-10-CM

## 2025-03-18 DIAGNOSIS — R93.1 ABNORMAL CORONARY ANGIOGRAM: ICD-10-CM

## 2025-03-18 DIAGNOSIS — I46.9 CARDIAC ARREST: ICD-10-CM

## 2025-03-18 DIAGNOSIS — I48.0 PAROXYSMAL ATRIAL FIBRILLATION (HCC): ICD-10-CM

## 2025-03-18 DIAGNOSIS — Z95.810 BIVENTRICULAR ICD (IMPLANTABLE CARDIOVERTER-DEFIBRILLATOR) IN PLACE: Primary | ICD-10-CM

## 2025-03-18 DIAGNOSIS — Z95.810 BIVENTRICULAR ICD (IMPLANTABLE CARDIOVERTER-DEFIBRILLATOR) IN PLACE: ICD-10-CM

## 2025-03-18 DIAGNOSIS — Z45.02 ENCOUNTER FOR ADJUSTMENT OF CARDIAC RESYNCHRONIZATION THERAPY DEFIBRILLATOR (CRT-D): Primary | ICD-10-CM

## 2025-03-18 DIAGNOSIS — I50.22 CHRONIC SYSTOLIC CONGESTIVE HEART FAILURE (HCC): ICD-10-CM

## 2025-03-18 PROCEDURE — 99214 OFFICE O/P EST MOD 30 MIN: CPT | Performed by: HOSPITALIST

## 2025-03-18 PROCEDURE — G8420 CALC BMI NORM PARAMETERS: HCPCS | Performed by: HOSPITALIST

## 2025-03-18 PROCEDURE — 1111F DSCHRG MED/CURRENT MED MERGE: CPT | Performed by: HOSPITALIST

## 2025-03-18 PROCEDURE — 93284 PRGRMG EVAL IMPLANTABLE DFB: CPT | Performed by: HOSPITALIST

## 2025-03-18 PROCEDURE — 1126F AMNT PAIN NOTED NONE PRSNT: CPT | Performed by: HOSPITALIST

## 2025-03-18 PROCEDURE — 3017F COLORECTAL CA SCREEN DOC REV: CPT | Performed by: HOSPITALIST

## 2025-03-18 PROCEDURE — 1123F ACP DISCUSS/DSCN MKR DOCD: CPT | Performed by: HOSPITALIST

## 2025-03-18 PROCEDURE — 1159F MED LIST DOCD IN RCRD: CPT | Performed by: HOSPITALIST

## 2025-03-18 PROCEDURE — 1160F RVW MEDS BY RX/DR IN RCRD: CPT | Performed by: HOSPITALIST

## 2025-03-18 PROCEDURE — 4004F PT TOBACCO SCREEN RCVD TLK: CPT | Performed by: HOSPITALIST

## 2025-03-18 PROCEDURE — G8427 DOCREV CUR MEDS BY ELIG CLIN: HCPCS | Performed by: HOSPITALIST

## 2025-03-19 DIAGNOSIS — Z95.810 BIVENTRICULAR ICD (IMPLANTABLE CARDIOVERTER-DEFIBRILLATOR) IN PLACE: Primary | ICD-10-CM

## 2025-03-19 PROCEDURE — 93284 PRGRMG EVAL IMPLANTABLE DFB: CPT | Performed by: HOSPITALIST

## 2025-03-20 NOTE — PROGRESS NOTES
had no chief complaint listed for this encounter.    Vitals:    03/18/25 1011   BP: 134/80   BP Site: Left Upper Arm   Patient Position: Sitting   BP Cuff Size: Medium Adult   Pulse: 66   Resp: 18   SpO2: 94%   Weight: 67.1 kg (148 lb)   Height: 1.829 m (6' 0.01\")        Chest pain No; some back soreness and back pain    Refills No        1. Have you been to the ER, urgent care clinic since your last visit? No       Hospitalized since your last visit? No       Where?        Date?  
[facility] PREP NOTE  Please fill out subjective and AF history below.  ***Dr. Sena will DELETE before signing visit  ===========================================    Primary Cardiologist: {CardiologistList:57159}    He {OFFICEprofession:18349::\"is ***\"}.    Jeb Pereira presents to electrophysiology clinic for management of {OFFICEFirstLineReasonForVisit:40998::\"Arrhythmia Issues\"}.    His current medical conditions and PMH are detailed below.      Today's visit:  Date: 3/18/2025***    ***    Denies palpitations***, dizziness, syncope and fatigue***. Denies chest pain***. Denies shortness of breath, paroxysmal nocturnal dyspnea, orthopnea and lower extremity edema***.      Cardiac Rhythm Testing     All EKGs were personally interpreted by me as below      Monitor results:  ***    ===================================================================    # {AFtype:07683} Atrial Fibrillation  Onset: ***  Symptoms: ***  Alcohol: ***  WANDA: ***  Prior AAD: ***  Current Meds/AAD: ***  Prior Cardioversion: ***  Ablation: ***  LA size: ***    # Anticoagulation  [id number] score of *** [for {SWTYE9OXIE list:01424}]  Anticoagulation: {Anticoag_list:52043}    # ***  ***  -- ***      # ***  ***  -- ***      # ***  ***  -- ***              
PCP are reviewed as well as pertinent prior admission documents. Time was spent with patient education, counseling, review of external records and care coordination independent of time spent on EMR.    ________________________________________  Stuart Sena MD  Cardiac Electrophysiology  [facility] [place]  [address]                         [place] [address] [phone] [address]  [place] [address] [phone]

## 2025-05-01 ENCOUNTER — HOSPITAL ENCOUNTER (OUTPATIENT)
Facility: HOSPITAL | Age: 75
Discharge: HOME | End: 2025-05-01
Payer: MEDICARE

## 2025-05-01 DIAGNOSIS — Z95.810 BIVENTRICULAR ICD (IMPLANTABLE CARDIOVERTER-DEFIBRILLATOR) IN PLACE: ICD-10-CM

## 2025-05-01 PROCEDURE — 71046 X-RAY EXAM CHEST 2 VIEWS: CPT

## 2025-05-03 ENCOUNTER — RESULTS FOLLOW-UP (OUTPATIENT)
Age: 75
End: 2025-05-03

## 2025-05-15 ENCOUNTER — HOSPITAL ENCOUNTER (OUTPATIENT)
Facility: HOSPITAL | Age: 75
Discharge: HOME | End: 2025-05-18
Attending: OTOLARYNGOLOGY
Payer: MEDICARE

## 2025-05-15 DIAGNOSIS — R91.1 PULMONARY NODULE: ICD-10-CM

## 2025-05-15 DIAGNOSIS — R22.1 NECK MASS: ICD-10-CM

## 2025-05-15 LAB — CREAT BLD-MCNC: 1.5 MG/DL (ref 0.6–1.3)

## 2025-05-15 PROCEDURE — 6360000004 HC RX CONTRAST MEDICATION: Performed by: OTOLARYNGOLOGY

## 2025-05-15 PROCEDURE — 71260 CT THORAX DX C+: CPT

## 2025-05-15 PROCEDURE — 70491 CT SOFT TISSUE NECK W/DYE: CPT

## 2025-05-15 PROCEDURE — 82565 ASSAY OF CREATININE: CPT

## 2025-05-15 RX ORDER — IOPAMIDOL 755 MG/ML
100 INJECTION, SOLUTION INTRAVASCULAR
Status: COMPLETED | OUTPATIENT
Start: 2025-05-15 | End: 2025-05-15

## 2025-05-15 RX ADMIN — IOPAMIDOL 100 ML: 755 INJECTION, SOLUTION INTRAVENOUS at 15:19

## 2025-06-03 ENCOUNTER — TRANSCRIBE ORDERS (OUTPATIENT)
Facility: HOSPITAL | Age: 75
End: 2025-06-03

## 2025-06-03 DIAGNOSIS — J38.00 PARALYSIS OF LARYNX: Primary | ICD-10-CM

## 2025-06-30 ENCOUNTER — OFFICE VISIT (OUTPATIENT)
Age: 75
End: 2025-06-30
Payer: MEDICARE

## 2025-06-30 ENCOUNTER — PROCEDURE VISIT (OUTPATIENT)
Age: 75
End: 2025-06-30

## 2025-06-30 VITALS
HEIGHT: 72 IN | SYSTOLIC BLOOD PRESSURE: 126 MMHG | WEIGHT: 148.4 LBS | DIASTOLIC BLOOD PRESSURE: 80 MMHG | BODY MASS INDEX: 20.1 KG/M2 | OXYGEN SATURATION: 98 % | HEART RATE: 62 BPM

## 2025-06-30 DIAGNOSIS — I25.10 CORONARY ARTERY DISEASE INVOLVING NATIVE CORONARY ARTERY OF NATIVE HEART WITHOUT ANGINA PECTORIS: ICD-10-CM

## 2025-06-30 DIAGNOSIS — Z86.74 HISTORY OF CARDIAC ARREST: ICD-10-CM

## 2025-06-30 DIAGNOSIS — Z95.810 BIVENTRICULAR ICD (IMPLANTABLE CARDIOVERTER-DEFIBRILLATOR) IN PLACE: Primary | ICD-10-CM

## 2025-06-30 DIAGNOSIS — I42.8 NICM (NONISCHEMIC CARDIOMYOPATHY) (HCC): ICD-10-CM

## 2025-06-30 DIAGNOSIS — Z95.2 HISTORY OF TRANSCATHETER AORTIC VALVE REPLACEMENT (TAVR): ICD-10-CM

## 2025-06-30 DIAGNOSIS — I35.0 NONRHEUMATIC AORTIC VALVE STENOSIS: ICD-10-CM

## 2025-06-30 DIAGNOSIS — I50.22 CHRONIC SYSTOLIC CHF (CONGESTIVE HEART FAILURE) (HCC): ICD-10-CM

## 2025-06-30 PROCEDURE — 99214 OFFICE O/P EST MOD 30 MIN: CPT | Performed by: NURSE PRACTITIONER

## 2025-06-30 PROCEDURE — 1159F MED LIST DOCD IN RCRD: CPT | Performed by: NURSE PRACTITIONER

## 2025-06-30 PROCEDURE — 93005 ELECTROCARDIOGRAM TRACING: CPT | Performed by: NURSE PRACTITIONER

## 2025-06-30 PROCEDURE — 1036F TOBACCO NON-USER: CPT | Performed by: NURSE PRACTITIONER

## 2025-06-30 PROCEDURE — G8420 CALC BMI NORM PARAMETERS: HCPCS | Performed by: NURSE PRACTITIONER

## 2025-06-30 PROCEDURE — G8427 DOCREV CUR MEDS BY ELIG CLIN: HCPCS | Performed by: NURSE PRACTITIONER

## 2025-06-30 PROCEDURE — 3017F COLORECTAL CA SCREEN DOC REV: CPT | Performed by: NURSE PRACTITIONER

## 2025-06-30 PROCEDURE — 1123F ACP DISCUSS/DSCN MKR DOCD: CPT | Performed by: NURSE PRACTITIONER

## 2025-06-30 PROCEDURE — 93010 ELECTROCARDIOGRAM REPORT: CPT | Performed by: NURSE PRACTITIONER

## 2025-06-30 NOTE — PROGRESS NOTES
Chief Complaint   Patient presents with    CM    PAF     Vitals:    06/30/25 1109   BP: 126/80   BP Site: Left Upper Arm   Patient Position: Sitting   Pulse: 62   SpO2: 98%   Weight: 67.3 kg (148 lb 6.4 oz)   Height: 1.829 m (6')       Chest pain NO     ER, urgent care, or hospitalized outside of [facility] since your last visit?  NO     Refills NO   
Faxed 6/30/25 office note to Dr. Gomez, S at fax number [phone].  Fax confirmation received.      
performed by Anish Manuel MD at [facility] CARDIAC CATH LAB    EP DEVICE PROCEDURE N/A 3/5/2025    Insert ICD multi performed by Stuart Sena MD at [facility] CARDIAC CATH LAB    INVASIVE VASCULAR N/A 2/26/2025    Ultrasound guided vascular access performed by Stuart Sena MD at [facility] CARDIAC CATH LAB    INVASIVE VASCULAR N/A 3/3/2025    Ultrasound guided vascular access performed by Anish Manuel MD at [facility] CARDIAC CATH LAB    INVASIVE VASCULAR N/A 3/5/2025    Ultrasound guided vascular access performed by Stuart Sena MD at [facility] CARDIAC CATH LAB       No Known Allergies    Social History     Tobacco Use    Smoking status: Never     Passive exposure: Never    Smokeless tobacco: Never   Substance Use Topics    Alcohol use: Never    Drug use: Never       No family history on file.        OBJECTIVE:    Physical Exam    Vitals:   Vitals:    06/30/25 1109   BP: 126/80   BP Site: Left Upper Arm   Patient Position: Sitting   Pulse: 62   SpO2: 98%   Weight: 67.3 kg (148 lb 6.4 oz)   Height: 1.829 m (6')         General:    Alert, cooperative, no distress, appears stated age.   Neck:   Supple, no JVD.   Back:     Symmetric.   Lungs:     Clear to auscultation bilaterally.   Heart:    Regular rate and rhythm.  2/6 systolic murmur, no click, rub or gallop.   Abdomen:     Soft, non-tender. Bowel sounds normal.    MSK:   Extremities normal, atraumatic.  Moves extremities independently.   Vasc/lymph:   No lower extremity edema.   Skin:   Skin color normal. No rashes or lesions on visible areas.  Left chest ICD site well healed, prominent along all borders.   Neurologic:   Alert, moves all extremities.        Data Review:     Radiology:   XR Results (most recent):    CT Result (most recent):  CT SOFT TISSUE NECK W CONTRAST 05/15/2025    Narrative  EXAM:  CT SOFT TISSUE NECK W CONTRAST, CT CHEST W CONTRAST    INDICATION:  Localized swelling, mass and lump, neck    COMPARISON: None.    CONTRAST: 100 mL of

## (undated) DEVICE — ANGIOGRAPHIC CATHETER: Brand: IMPULSE™

## (undated) DEVICE — SUTURE MONOCRYL + ABSORBABLE MONOFILAMENT 2-0 CT1 12 IN UD SXMP1B412

## (undated) DEVICE — GUIDEWIRE VASC J 3 MM 0.035 INX210 CM FIX COR INQWIRE

## (undated) DEVICE — TR BAND RADIAL ARTERY COMPRESSION DEVICE: Brand: TR BAND

## (undated) DEVICE — DRESSING ANTIMIC FOAM OPTIFOAM POSTOP ADH 4 X 6 IN

## (undated) DEVICE — KENDALL DL ECG DUAL CONNECT RADIOLUCENT LEAD WIRES, 5-LEAD, SINGLE PATIENT USE: Brand: KENDALL

## (undated) DEVICE — CATHETER GUID OD7FR ID5.4FR L40CM C315

## (undated) DEVICE — INTRODUCER SHTH L13CM OD7FR SH ORNG HUB SEAMLESS SAFSHTH

## (undated) DEVICE — GUIDE WIRE WITH HYDROPHILIC COATING: Brand: ACUITY WHISPER VIEW™

## (undated) DEVICE — HI-TORQUE VERSACORE MODIFIED J GUIDE WIRE SYSTEM 260 CM: Brand: HI-TORQUE VERSACORE

## (undated) DEVICE — KIT INTRO 9FR L13CM DIA0.118IN SPLITTABLE HEMSTAT ROBUST

## (undated) DEVICE — Device

## (undated) DEVICE — HEART CATH-SFMC: Brand: MEDLINE INDUSTRIES, INC.

## (undated) DEVICE — SYRINGE IRRIG 60ML SFT PLIABLE BLB EZ TO GRP 1 HND USE W/

## (undated) DEVICE — TUBING, SUCTION, 1/4" X 10', STRAIGHT: Brand: MEDLINE

## (undated) DEVICE — INTRO SAFESHEATH 7FR 25CM W/SIDEPORT

## (undated) DEVICE — MEDI-TRACE CADENCE ADULT, DEFIBRILLATION ELECTRODE -RTS  (10 PR/PK) - PHYSIO-CONTROL: Brand: MEDI-TRACE CADENCE

## (undated) DEVICE — APPLICATOR MEDICATED 26 CC SOLUTION HI LT ORNG CHLORAPREP

## (undated) DEVICE — CATHETER PRESSURE WEDGE BLLN 6FRX110CM

## (undated) DEVICE — 3M™ IOBAN™ 2 ANTIMICROBIAL INCISE DRAPE 6640EZ: Brand: IOBAN™ 2

## (undated) DEVICE — RADIFOCUS GLIDEWIRE: Brand: GLIDEWIRE

## (undated) DEVICE — COVER US PRB W15XL120CM W/ GEL RUBBERBAND TAPE STRP FLD GEN

## (undated) DEVICE — SUTURE ETHIBOND EXCEL SZ 0 L30IN NONABSORBABLE GRN CT1 L36MM X424H

## (undated) DEVICE — 3M™ TEGADERM™ CHG DRESSING 25/CARTON 4 CARTONS/CASE 1657: Brand: TEGADERM™

## (undated) DEVICE — SUTURE MONOCRYL STRATAFIX SPRL + SZ 4-0 L12IN ABSRB UD PS-2 SXMP1B117

## (undated) DEVICE — INTRODUCER SHTH 0.018 IN 4 FRX40 CM KT SFT TIP NIT SS VSI

## (undated) DEVICE — AGENT HEMSTAT 3GM OXIDIZED REGENERATED CELOS ABSRB FOR CONT (ORDER MULTIPLES OF 5EA)

## (undated) DEVICE — SENSOR OXMTR AD PED DISP NSL ALAR SPO2 XHALE ASSURANCE